# Patient Record
Sex: FEMALE | Race: WHITE | Employment: UNEMPLOYED | URBAN - METROPOLITAN AREA
[De-identification: names, ages, dates, MRNs, and addresses within clinical notes are randomized per-mention and may not be internally consistent; named-entity substitution may affect disease eponyms.]

---

## 2020-02-04 ENCOUNTER — TELEPHONE (OUTPATIENT)
Dept: ADMINISTRATIVE | Age: 35
End: 2020-02-04

## 2020-09-22 ENCOUNTER — HOSPITAL ENCOUNTER (OUTPATIENT)
Age: 35
Discharge: HOME | End: 2020-09-22
Payer: COMMERCIAL

## 2020-09-22 ENCOUNTER — HOSPITAL ENCOUNTER (OUTPATIENT)
Dept: ULTRASOUND IMAGING | Age: 35
Discharge: HOME | End: 2020-09-24
Payer: COMMERCIAL

## 2020-09-22 LAB
T4 TOTAL: 9.9 MCG/DL (ref 4.5–11.7)
TSH SERPL DL<=0.05 MIU/L-ACNC: 1.12 UIU/ML (ref 0.27–4.2)

## 2020-09-22 PROCEDURE — 84443 ASSAY THYROID STIM HORMONE: CPT

## 2020-09-22 PROCEDURE — 36415 COLL VENOUS BLD VENIPUNCTURE: CPT

## 2020-09-22 PROCEDURE — 76536 US EXAM OF HEAD AND NECK: CPT

## 2020-09-22 PROCEDURE — 84482 T3 REVERSE: CPT

## 2020-09-22 PROCEDURE — 84436 ASSAY OF TOTAL THYROXINE: CPT

## 2020-09-26 LAB — T3 REVERSE: 14.8 NG/DL (ref 9–27)

## 2020-10-16 ENCOUNTER — HOSPITAL ENCOUNTER (OUTPATIENT)
Dept: ULTRASOUND IMAGING | Age: 35
Discharge: HOME | End: 2020-10-18
Payer: COMMERCIAL

## 2020-10-16 PROCEDURE — 88305 TISSUE EXAM BY PATHOLOGIST: CPT

## 2020-10-16 PROCEDURE — 10005 FNA BX W/US GDN 1ST LES: CPT | Performed by: RADIOLOGY

## 2020-10-16 PROCEDURE — 88173 CYTOPATH EVAL FNA REPORT: CPT

## 2020-10-16 PROCEDURE — 10005 FNA BX W/US GDN 1ST LES: CPT

## 2020-10-16 NOTE — BRIEF OP NOTE
Brief Postoperative Note    Kyler Patterson  YOB: 1985  [id number]    Pre-operative Diagnosis: mass    Post-operative Diagnosis: Same    Procedure: biopsy    Estimated Blood Loss: < 10 cc    Surgeon: Nathanael IZAGUIRRE     Complications: none    Specimens obtained: Yes, biopsy of mass    Findings: biopsy of a mass      Nathanael Espinoza II   10/16/2020 11:47 AM

## 2020-10-16 NOTE — H&P
Interventional Radiology  Attending Pre-operative History and Physical    DIAGNOSIS:    Patient Active Problem List   Diagnosis    Internal derangement of left knee       CHIEF COMPLAINT: <principal problem not specified>        No current outpatient medications on file. No Known Allergies    No past medical history on file. History reviewed. No pertinent surgical history.     Family History   Problem Relation Age of Onset    Heart Disease Father        Social History     Socioeconomic History    Marital status:      Spouse name: Not on file    Number of children: Not on file    Years of education: Not on file    Highest education level: Not on file   Occupational History    Not on file   Social Needs    Financial resource strain: Not on file    Food insecurity     Worry: Not on file     Inability: Not on file    Transportation needs     Medical: Not on file     Non-medical: Not on file   Tobacco Use    Smoking status: Current Every Day Smoker     Packs/day: 0.50     Types: Cigarettes   Substance and Sexual Activity    Alcohol use: Yes     Comment: occ    Drug use: No    Sexual activity: Yes     Partners: Male   Lifestyle    Physical activity     Days per week: Not on file     Minutes per session: Not on file    Stress: Not on file   Relationships    Social connections     Talks on phone: Not on file     Gets together: Not on file     Attends [employer] service: Not on file     Active member of club or organization: Not on file     Attends meetings of clubs or organizations: Not on file     Relationship status: Not on file    Intimate partner violence     Fear of current or ex partner: Not on file     Emotionally abused: Not on file     Physically abused: Not on file     Forced sexual activity: Not on file   Other Topics Concern    Not on file   Social History Narrative    Not on file       ROS: Non-contributory other than as noted above    PHYSICAL EXAM:      Heent: Alert and orientated. Heart:  Rapid regular rhythm    Lungs:  demonstrate no contraindications to proceed      Abdomen:  normal      DATA:  CBC:   Lab Results   Component Value Date    WBC 13.0 10/09/2012    RBC 4.66 10/09/2012    HGB 14.4 10/09/2012    HCT 42.1 10/09/2012    MCV 90.4 10/09/2012    MCH 31.0 10/09/2012    MCHC 34.3 10/09/2012    RDW 12.8 10/09/2012     10/09/2012    MPV 8.3 10/09/2012     CBC with Differential:    Lab Results   Component Value Date    WBC 13.0 10/09/2012    RBC 4.66 10/09/2012    HGB 14.4 10/09/2012    HCT 42.1 10/09/2012     10/09/2012    MCV 90.4 10/09/2012    MCH 31.0 10/09/2012    MCHC 34.3 10/09/2012    RDW 12.8 10/09/2012    SEGSPCT 84 10/09/2012    LYMPHOPCT 12 10/09/2012    MONOPCT 4 10/09/2012    BASOPCT 0 10/09/2012    MONOSABS 0.49 10/09/2012    LYMPHSABS 1.53 10/09/2012    EOSABS 0.03 10/09/2012    BASOSABS 0.04 10/09/2012     Platelets:    Lab Results   Component Value Date     10/09/2012     BUN/Creatinine:    Lab Results   Component Value Date    BUN 9 10/09/2012    CREATININE 0.8 10/09/2012       ASSESSMENT AND PLAN:  1. Bilateral thyroid masses  2. Procedure options, risks and benefits reviewed with patient. Patient expresses understanding.     Electronically signed by Zoila Gonzalez II, MD on 10/16/2020 at 11:46 AM

## 2020-11-06 ENCOUNTER — HOSPITAL ENCOUNTER (OUTPATIENT)
Age: 35
Discharge: HOME | End: 2020-11-08
Payer: COMMERCIAL

## 2020-11-06 PROCEDURE — U0003 INFECTIOUS AGENT DETECTION BY NUCLEIC ACID (DNA OR RNA); SEVERE ACUTE RESPIRATORY SYNDROME CORONAVIRUS 2 (SARS-COV-2) (CORONAVIRUS DISEASE [COVID-19]), AMPLIFIED PROBE TECHNIQUE, MAKING USE OF HIGH THROUGHPUT TECHNOLOGIES AS DESCRIBED BY CMS-2020-01-R: HCPCS

## 2020-11-08 LAB
SARS-COV-2: NOT DETECTED
SOURCE: NORMAL

## 2020-11-10 NOTE — PROGRESS NOTES
[facility] PRE-ADMISSION TESTING INSTRUCTIONS    The Preadmission Testing patient is instructed accordingly using the following criteria (check applicable):    ARRIVAL INSTRUCTIONS:  [x] Parking the day of Surgery is located in the Main Entrance lot. Upon entering the door, make an immediate right to the surgery reception desk    [x] Bring photo ID and insurance card    [] Bring in a copy of Living will or Durable Power of  papers. [x] Please be sure to arrange transportation to and from the hospital    [x] Please arrange for someone to be with you the remainder of the day due to having anesthesia      GENERAL INSTRUCTIONS:    [x] Nothing by mouth after midnight, including gum, candy, mints or water    [x] You may brush your teeth, but do not swallow any water    [] Take medications as instructed with 1-2 oz of water    [] Stop herbal supplements and vitamins 5 days prior to procedure    [] Follow preop dosing of blood thinners per physician instructions    [] Do not take insulin or oral diabetic medications    [] If diabetic and have low blood sugar or feel symptomatic, take 1-2oz apple juice or glucose tablets    [] Bring inhalers day of surgery    [] Bring C-PAP/ Bi-Pap day of surgery    [] Bring urine specimen day of surgery    [x] Antibacterial Soap shower or bath AM of Surgery, no lotion, powders or creams to surgical site    [] Follow bowel prep as instructed per surgeon    [x] No tobacco products within 24 hours of surgery     [] No alcohol or illegal drug use within 24 hours of surgery.     [x] Jewelry, body piercing's, eyeglasses, contact lenses and dentures are not permitted into surgery (bring cases)      [x] Please do not wear any nail polish or make up on the day of surgery    [x] If not already done, you can expect a call from registration    [x] If surgeon requests a time change you will be notified the day prior to surgery    [x] If you receive a survey after

## 2020-11-11 ENCOUNTER — ANESTHESIA EVENT (OUTPATIENT)
Dept: OPERATING ROOM | Age: 35
End: 2020-11-11
Payer: COMMERCIAL

## 2020-11-12 ENCOUNTER — ANESTHESIA (OUTPATIENT)
Dept: OPERATING ROOM | Age: 35
End: 2020-11-12
Payer: COMMERCIAL

## 2020-11-12 ENCOUNTER — HOSPITAL ENCOUNTER (OUTPATIENT)
Age: 35
Setting detail: OBSERVATION
Discharge: HOME | End: 2020-11-13
Attending: OTOLARYNGOLOGY | Admitting: OTOLARYNGOLOGY
Payer: COMMERCIAL

## 2020-11-12 VITALS — SYSTOLIC BLOOD PRESSURE: 114 MMHG | OXYGEN SATURATION: 99 % | TEMPERATURE: 97.3 F | DIASTOLIC BLOOD PRESSURE: 78 MMHG

## 2020-11-12 PROBLEM — C73 PAPILLARY THYROID CARCINOMA (HCC): Status: ACTIVE | Noted: 2020-11-12

## 2020-11-12 LAB
ABO/RH: NORMAL
ALBUMIN SERPL-MCNC: 4.1 G/DL (ref 3.5–5.2)
ALP BLD-CCNC: 60 U/L (ref 35–104)
ALT SERPL-CCNC: 19 U/L (ref 0–32)
ANION GAP SERPL CALCULATED.3IONS-SCNC: 11 MMOL/L (ref 7–16)
ANTIBODY SCREEN: NORMAL
AST SERPL-CCNC: 28 U/L (ref 0–31)
BILIRUB SERPL-MCNC: 0.4 MG/DL (ref 0–1.2)
BUN BLDV-MCNC: 11 MG/DL (ref 6–20)
CALCIUM IONIZED: 1.25 MMOL/L (ref 1.15–1.33)
CALCIUM IONIZED: 1.26 MMOL/L (ref 1.15–1.33)
CALCIUM SERPL-MCNC: 8.6 MG/DL (ref 8.6–10.2)
CALCIUM SERPL-MCNC: 9 MG/DL (ref 8.6–10.2)
CALCIUM SERPL-MCNC: 9.2 MG/DL (ref 8.6–10.2)
CHLORIDE BLD-SCNC: 107 MMOL/L (ref 98–107)
CO2: 21 MMOL/L (ref 22–29)
CREAT SERPL-MCNC: 0.7 MG/DL (ref 0.5–1)
GFR AFRICAN AMERICAN: >60
GFR NON-AFRICAN AMERICAN: >60 ML/MIN/1.73
GLUCOSE BLD-MCNC: 102 MG/DL (ref 74–99)
HCG(URINE) PREGNANCY TEST: NEGATIVE
HCT VFR BLD CALC: 40.8 % (ref 34–48)
HEMOGLOBIN: 13.7 G/DL (ref 11.5–15.5)
MCH RBC QN AUTO: 29.9 PG (ref 26–35)
MCHC RBC AUTO-ENTMCNC: 33.6 % (ref 32–34.5)
MCV RBC AUTO: 89.1 FL (ref 80–99.9)
PARATHYROID HORMONE INTACT: 34 PG/ML (ref 15–65)
PDW BLD-RTO: 13.2 FL (ref 11.5–15)
PLATELET # BLD: 334 E9/L (ref 130–450)
PMV BLD AUTO: 9.7 FL (ref 7–12)
POTASSIUM REFLEX MAGNESIUM: 4.7 MMOL/L (ref 3.5–5)
RBC # BLD: 4.58 E12/L (ref 3.5–5.5)
SODIUM BLD-SCNC: 139 MMOL/L (ref 132–146)
TOTAL PROTEIN: 7.2 G/DL (ref 6.4–8.3)
WBC # BLD: 8.5 E9/L (ref 4.5–11.5)

## 2020-11-12 PROCEDURE — 86901 BLOOD TYPING SEROLOGIC RH(D): CPT

## 2020-11-12 PROCEDURE — 82330 ASSAY OF CALCIUM: CPT

## 2020-11-12 PROCEDURE — 36415 COLL VENOUS BLD VENIPUNCTURE: CPT

## 2020-11-12 PROCEDURE — 3700000000 HC ANESTHESIA ATTENDED CARE: Performed by: OTOLARYNGOLOGY

## 2020-11-12 PROCEDURE — 3600000013 HC SURGERY LEVEL 3 ADDTL 15MIN: Performed by: OTOLARYNGOLOGY

## 2020-11-12 PROCEDURE — 6360000002 HC RX W HCPCS: Performed by: OTOLARYNGOLOGY

## 2020-11-12 PROCEDURE — 88331 PATH CONSLTJ SURG 1 BLK 1SPC: CPT

## 2020-11-12 PROCEDURE — 2580000003 HC RX 258: Performed by: OTOLARYNGOLOGY

## 2020-11-12 PROCEDURE — 2500000003 HC RX 250 WO HCPCS

## 2020-11-12 PROCEDURE — 82310 ASSAY OF CALCIUM: CPT

## 2020-11-12 PROCEDURE — 3600000003 HC SURGERY LEVEL 3 BASE: Performed by: OTOLARYNGOLOGY

## 2020-11-12 PROCEDURE — 6360000002 HC RX W HCPCS

## 2020-11-12 PROCEDURE — 6360000002 HC RX W HCPCS: Performed by: ANESTHESIOLOGY

## 2020-11-12 PROCEDURE — 7100000001 HC PACU RECOVERY - ADDTL 15 MIN: Performed by: OTOLARYNGOLOGY

## 2020-11-12 PROCEDURE — 6370000000 HC RX 637 (ALT 250 FOR IP): Performed by: OTOLARYNGOLOGY

## 2020-11-12 PROCEDURE — 3700000001 HC ADD 15 MINUTES (ANESTHESIA): Performed by: OTOLARYNGOLOGY

## 2020-11-12 PROCEDURE — 85027 COMPLETE CBC AUTOMATED: CPT

## 2020-11-12 PROCEDURE — 86850 RBC ANTIBODY SCREEN: CPT

## 2020-11-12 PROCEDURE — 2709999900 HC NON-CHARGEABLE SUPPLY: Performed by: OTOLARYNGOLOGY

## 2020-11-12 PROCEDURE — 2720000010 HC SURG SUPPLY STERILE: Performed by: OTOLARYNGOLOGY

## 2020-11-12 PROCEDURE — 2500000003 HC RX 250 WO HCPCS: Performed by: OTOLARYNGOLOGY

## 2020-11-12 PROCEDURE — G0378 HOSPITAL OBSERVATION PER HR: HCPCS

## 2020-11-12 PROCEDURE — 83970 ASSAY OF PARATHORMONE: CPT

## 2020-11-12 PROCEDURE — 86900 BLOOD TYPING SEROLOGIC ABO: CPT

## 2020-11-12 PROCEDURE — 7100000000 HC PACU RECOVERY - FIRST 15 MIN: Performed by: OTOLARYNGOLOGY

## 2020-11-12 PROCEDURE — 88307 TISSUE EXAM BY PATHOLOGIST: CPT

## 2020-11-12 PROCEDURE — 80053 COMPREHEN METABOLIC PANEL: CPT

## 2020-11-12 PROCEDURE — 81025 URINE PREGNANCY TEST: CPT

## 2020-11-12 RX ORDER — OXYCODONE HYDROCHLORIDE 5 MG/1
10 TABLET ORAL EVERY 4 HOURS PRN
Status: CANCELLED | OUTPATIENT
Start: 2020-11-12

## 2020-11-12 RX ORDER — ONDANSETRON 2 MG/ML
4 INJECTION INTRAMUSCULAR; INTRAVENOUS EVERY 6 HOURS PRN
Status: CANCELLED | OUTPATIENT
Start: 2020-11-12

## 2020-11-12 RX ORDER — SENNA AND DOCUSATE SODIUM 50; 8.6 MG/1; MG/1
1 TABLET, FILM COATED ORAL 2 TIMES DAILY
Status: DISCONTINUED | OUTPATIENT
Start: 2020-11-12 | End: 2020-11-13 | Stop reason: HOSPADM

## 2020-11-12 RX ORDER — OYSTER SHELL CALCIUM WITH VITAMIN D 500; 200 MG/1; [IU]/1
1 TABLET, FILM COATED ORAL DAILY
Status: DISCONTINUED | OUTPATIENT
Start: 2020-11-12 | End: 2020-11-13 | Stop reason: HOSPADM

## 2020-11-12 RX ORDER — MIDAZOLAM HYDROCHLORIDE 1 MG/ML
INJECTION INTRAMUSCULAR; INTRAVENOUS PRN
Status: DISCONTINUED | OUTPATIENT
Start: 2020-11-12 | End: 2020-11-12 | Stop reason: SDUPTHER

## 2020-11-12 RX ORDER — CEPHALEXIN 500 MG/1
500 CAPSULE ORAL 4 TIMES DAILY
Qty: 28 CAPSULE | Refills: 0 | Status: SHIPPED | OUTPATIENT
Start: 2020-11-12 | End: 2020-11-19

## 2020-11-12 RX ORDER — MEPERIDINE HYDROCHLORIDE 25 MG/ML
12.5 INJECTION INTRAMUSCULAR; INTRAVENOUS; SUBCUTANEOUS EVERY 5 MIN PRN
Status: DISCONTINUED | OUTPATIENT
Start: 2020-11-12 | End: 2020-11-12

## 2020-11-12 RX ORDER — PROMETHAZINE HYDROCHLORIDE 25 MG/ML
6.25 INJECTION, SOLUTION INTRAMUSCULAR; INTRAVENOUS
Status: DISCONTINUED | OUTPATIENT
Start: 2020-11-12 | End: 2020-11-12

## 2020-11-12 RX ORDER — SODIUM CHLORIDE, SODIUM LACTATE, POTASSIUM CHLORIDE, CALCIUM CHLORIDE 600; 310; 30; 20 MG/100ML; MG/100ML; MG/100ML; MG/100ML
INJECTION, SOLUTION INTRAVENOUS CONTINUOUS
Status: DISCONTINUED | OUTPATIENT
Start: 2020-11-12 | End: 2020-11-13 | Stop reason: HOSPADM

## 2020-11-12 RX ORDER — OYSTER SHELL CALCIUM WITH VITAMIN D 500; 200 MG/1; [IU]/1
1 TABLET, FILM COATED ORAL DAILY
Status: DISCONTINUED | OUTPATIENT
Start: 2020-11-12 | End: 2020-11-12

## 2020-11-12 RX ORDER — SODIUM CHLORIDE 0.9 % (FLUSH) 0.9 %
10 SYRINGE (ML) INJECTION PRN
Status: DISCONTINUED | OUTPATIENT
Start: 2020-11-12 | End: 2020-11-13 | Stop reason: HOSPADM

## 2020-11-12 RX ORDER — FENTANYL CITRATE 50 UG/ML
INJECTION, SOLUTION INTRAMUSCULAR; INTRAVENOUS PRN
Status: DISCONTINUED | OUTPATIENT
Start: 2020-11-12 | End: 2020-11-12 | Stop reason: SDUPTHER

## 2020-11-12 RX ORDER — PROPOFOL 10 MG/ML
INJECTION, EMULSION INTRAVENOUS PRN
Status: DISCONTINUED | OUTPATIENT
Start: 2020-11-12 | End: 2020-11-12 | Stop reason: SDUPTHER

## 2020-11-12 RX ORDER — LIDOCAINE HYDROCHLORIDE 20 MG/ML
INJECTION, SOLUTION EPIDURAL; INFILTRATION; INTRACAUDAL; PERINEURAL PRN
Status: DISCONTINUED | OUTPATIENT
Start: 2020-11-12 | End: 2020-11-12 | Stop reason: SDUPTHER

## 2020-11-12 RX ORDER — DIPHENHYDRAMINE HYDROCHLORIDE 50 MG/ML
12.5 INJECTION INTRAMUSCULAR; INTRAVENOUS
Status: DISCONTINUED | OUTPATIENT
Start: 2020-11-12 | End: 2020-11-12

## 2020-11-12 RX ORDER — PHENYLEPHRINE HYDROCHLORIDE 10 MG/ML
INJECTION INTRAVENOUS PRN
Status: DISCONTINUED | OUTPATIENT
Start: 2020-11-12 | End: 2020-11-12 | Stop reason: SDUPTHER

## 2020-11-12 RX ORDER — SODIUM CHLORIDE 0.9 % (FLUSH) 0.9 %
10 SYRINGE (ML) INJECTION PRN
Status: CANCELLED | OUTPATIENT
Start: 2020-11-12

## 2020-11-12 RX ORDER — LEVOTHYROXINE SODIUM 0.1 MG/1
100 TABLET ORAL DAILY
Status: DISCONTINUED | OUTPATIENT
Start: 2020-11-12 | End: 2020-11-12

## 2020-11-12 RX ORDER — PROMETHAZINE HYDROCHLORIDE 25 MG/1
12.5 TABLET ORAL EVERY 6 HOURS PRN
Status: DISCONTINUED | OUTPATIENT
Start: 2020-11-12 | End: 2020-11-13 | Stop reason: HOSPADM

## 2020-11-12 RX ORDER — OXYCODONE HYDROCHLORIDE 5 MG/1
10 TABLET ORAL EVERY 4 HOURS PRN
Status: DISCONTINUED | OUTPATIENT
Start: 2020-11-12 | End: 2020-11-13 | Stop reason: HOSPADM

## 2020-11-12 RX ORDER — SEVOFLURANE 250 ML/250ML
LIQUID RESPIRATORY (INHALATION) CONTINUOUS PRN
Status: DISCONTINUED | OUTPATIENT
Start: 2020-11-12 | End: 2020-11-12 | Stop reason: SDUPTHER

## 2020-11-12 RX ORDER — PROMETHAZINE HYDROCHLORIDE 25 MG/1
12.5 TABLET ORAL EVERY 6 HOURS PRN
Status: CANCELLED | OUTPATIENT
Start: 2020-11-12

## 2020-11-12 RX ORDER — MORPHINE SULFATE 2 MG/ML
2 INJECTION, SOLUTION INTRAMUSCULAR; INTRAVENOUS
Status: CANCELLED | OUTPATIENT
Start: 2020-11-12

## 2020-11-12 RX ORDER — MORPHINE SULFATE 2 MG/ML
2 INJECTION, SOLUTION INTRAMUSCULAR; INTRAVENOUS
Status: DISCONTINUED | OUTPATIENT
Start: 2020-11-12 | End: 2020-11-13 | Stop reason: HOSPADM

## 2020-11-12 RX ORDER — SENNA AND DOCUSATE SODIUM 50; 8.6 MG/1; MG/1
1 TABLET, FILM COATED ORAL 2 TIMES DAILY
Status: CANCELLED | OUTPATIENT
Start: 2020-11-12

## 2020-11-12 RX ORDER — OYSTER SHELL CALCIUM WITH VITAMIN D 500; 200 MG/1; [IU]/1
1 TABLET, FILM COATED ORAL DAILY
Qty: 30 TABLET | Refills: 3 | Status: ON HOLD | OUTPATIENT
Start: 2020-11-12 | End: 2022-07-24 | Stop reason: HOSPADM

## 2020-11-12 RX ORDER — OXYCODONE HYDROCHLORIDE AND ACETAMINOPHEN 5; 325 MG/1; MG/1
1 TABLET ORAL
Status: DISCONTINUED | OUTPATIENT
Start: 2020-11-12 | End: 2020-11-12

## 2020-11-12 RX ORDER — SODIUM CHLORIDE 0.9 % (FLUSH) 0.9 %
10 SYRINGE (ML) INJECTION PRN
Status: DISCONTINUED | OUTPATIENT
Start: 2020-11-12 | End: 2020-11-12

## 2020-11-12 RX ORDER — OXYCODONE HYDROCHLORIDE 5 MG/1
5 TABLET ORAL EVERY 4 HOURS PRN
Status: DISCONTINUED | OUTPATIENT
Start: 2020-11-12 | End: 2020-11-13 | Stop reason: HOSPADM

## 2020-11-12 RX ORDER — ACETAMINOPHEN 325 MG/1
650 TABLET ORAL EVERY 6 HOURS
Status: CANCELLED | OUTPATIENT
Start: 2020-11-12

## 2020-11-12 RX ORDER — SODIUM CHLORIDE 0.9 % (FLUSH) 0.9 %
10 SYRINGE (ML) INJECTION EVERY 12 HOURS SCHEDULED
Status: CANCELLED | OUTPATIENT
Start: 2020-11-12

## 2020-11-12 RX ORDER — ONDANSETRON 2 MG/ML
INJECTION INTRAMUSCULAR; INTRAVENOUS PRN
Status: DISCONTINUED | OUTPATIENT
Start: 2020-11-12 | End: 2020-11-12 | Stop reason: SDUPTHER

## 2020-11-12 RX ORDER — ONDANSETRON 4 MG/1
4 TABLET, ORALLY DISINTEGRATING ORAL EVERY 8 HOURS PRN
Qty: 21 TABLET | Refills: 0 | Status: SHIPPED | OUTPATIENT
Start: 2020-11-12 | End: 2020-11-19

## 2020-11-12 RX ORDER — ACETAMINOPHEN 325 MG/1
650 TABLET ORAL EVERY 6 HOURS
Status: DISCONTINUED | OUTPATIENT
Start: 2020-11-12 | End: 2020-11-13 | Stop reason: HOSPADM

## 2020-11-12 RX ORDER — LEVOTHYROXINE SODIUM 0.1 MG/1
100 TABLET ORAL DAILY
Qty: 30 TABLET | Refills: 3 | Status: ON HOLD | OUTPATIENT
Start: 2020-11-12 | End: 2022-07-24 | Stop reason: HOSPADM

## 2020-11-12 RX ORDER — FENTANYL CITRATE 50 UG/ML
50 INJECTION, SOLUTION INTRAMUSCULAR; INTRAVENOUS EVERY 5 MIN PRN
Status: DISCONTINUED | OUTPATIENT
Start: 2020-11-12 | End: 2020-11-12

## 2020-11-12 RX ORDER — SODIUM CHLORIDE, SODIUM LACTATE, POTASSIUM CHLORIDE, CALCIUM CHLORIDE 600; 310; 30; 20 MG/100ML; MG/100ML; MG/100ML; MG/100ML
INJECTION, SOLUTION INTRAVENOUS CONTINUOUS
Status: CANCELLED | OUTPATIENT
Start: 2020-11-12

## 2020-11-12 RX ORDER — LEVOTHYROXINE SODIUM 0.1 MG/1
100 TABLET ORAL DAILY
Status: DISCONTINUED | OUTPATIENT
Start: 2020-11-12 | End: 2020-11-13 | Stop reason: HOSPADM

## 2020-11-12 RX ORDER — SUCCINYLCHOLINE/SOD CL,ISO/PF 200MG/10ML
SYRINGE (ML) INTRAVENOUS PRN
Status: DISCONTINUED | OUTPATIENT
Start: 2020-11-12 | End: 2020-11-12 | Stop reason: SDUPTHER

## 2020-11-12 RX ORDER — SODIUM CHLORIDE 0.9 % (FLUSH) 0.9 %
10 SYRINGE (ML) INJECTION EVERY 12 HOURS SCHEDULED
Status: DISCONTINUED | OUTPATIENT
Start: 2020-11-12 | End: 2020-11-12

## 2020-11-12 RX ORDER — SODIUM CHLORIDE, SODIUM LACTATE, POTASSIUM CHLORIDE, CALCIUM CHLORIDE 600; 310; 30; 20 MG/100ML; MG/100ML; MG/100ML; MG/100ML
INJECTION, SOLUTION INTRAVENOUS CONTINUOUS
Status: DISCONTINUED | OUTPATIENT
Start: 2020-11-12 | End: 2020-11-12

## 2020-11-12 RX ORDER — SODIUM CHLORIDE 0.9 % (FLUSH) 0.9 %
10 SYRINGE (ML) INJECTION EVERY 12 HOURS SCHEDULED
Status: DISCONTINUED | OUTPATIENT
Start: 2020-11-12 | End: 2020-11-13 | Stop reason: HOSPADM

## 2020-11-12 RX ORDER — LIDOCAINE HYDROCHLORIDE AND EPINEPHRINE 10; 10 MG/ML; UG/ML
INJECTION, SOLUTION INFILTRATION; PERINEURAL PRN
Status: DISCONTINUED | OUTPATIENT
Start: 2020-11-12 | End: 2020-11-12 | Stop reason: ALTCHOICE

## 2020-11-12 RX ORDER — OXYCODONE HYDROCHLORIDE 5 MG/1
5 TABLET ORAL EVERY 4 HOURS PRN
Status: CANCELLED | OUTPATIENT
Start: 2020-11-12

## 2020-11-12 RX ORDER — HYDROCODONE BITARTRATE AND ACETAMINOPHEN 5; 325 MG/1; MG/1
1 TABLET ORAL EVERY 4 HOURS PRN
Qty: 18 TABLET | Refills: 0 | Status: SHIPPED | OUTPATIENT
Start: 2020-11-12 | End: 2020-11-15

## 2020-11-12 RX ORDER — ONDANSETRON 2 MG/ML
4 INJECTION INTRAMUSCULAR; INTRAVENOUS EVERY 6 HOURS PRN
Status: DISCONTINUED | OUTPATIENT
Start: 2020-11-12 | End: 2020-11-13 | Stop reason: HOSPADM

## 2020-11-12 RX ORDER — FENTANYL CITRATE 50 UG/ML
25 INJECTION, SOLUTION INTRAMUSCULAR; INTRAVENOUS EVERY 5 MIN PRN
Status: DISCONTINUED | OUTPATIENT
Start: 2020-11-12 | End: 2020-11-12

## 2020-11-12 RX ADMIN — Medication 1 TABLET: at 13:25

## 2020-11-12 RX ADMIN — SODIUM CHLORIDE, POTASSIUM CHLORIDE, SODIUM LACTATE AND CALCIUM CHLORIDE: 600; 310; 30; 20 INJECTION, SOLUTION INTRAVENOUS at 14:17

## 2020-11-12 RX ADMIN — FENTANYL CITRATE 25 MCG: 50 INJECTION, SOLUTION INTRAMUSCULAR; INTRAVENOUS at 11:55

## 2020-11-12 RX ADMIN — HYDROMORPHONE HYDROCHLORIDE 0.5 MG: 1 INJECTION, SOLUTION INTRAMUSCULAR; INTRAVENOUS; SUBCUTANEOUS at 12:32

## 2020-11-12 RX ADMIN — OXYCODONE HYDROCHLORIDE 5 MG: 5 TABLET ORAL at 18:27

## 2020-11-12 RX ADMIN — SEVOFLURANE 2 %: 1 LIQUID RESPIRATORY (INHALATION) at 09:18

## 2020-11-12 RX ADMIN — FENTANYL CITRATE 50 MCG: 50 INJECTION, SOLUTION INTRAMUSCULAR; INTRAVENOUS at 10:41

## 2020-11-12 RX ADMIN — FENTANYL CITRATE 50 MCG: 50 INJECTION, SOLUTION INTRAMUSCULAR; INTRAVENOUS at 09:16

## 2020-11-12 RX ADMIN — PROPOFOL 200 MG: 10 INJECTION, EMULSION INTRAVENOUS at 09:16

## 2020-11-12 RX ADMIN — FENTANYL CITRATE 25 MCG: 50 INJECTION, SOLUTION INTRAMUSCULAR; INTRAVENOUS at 12:00

## 2020-11-12 RX ADMIN — PHENYLEPHRINE HYDROCHLORIDE 200 MCG: 10 INJECTION INTRAVENOUS at 09:33

## 2020-11-12 RX ADMIN — SODIUM CHLORIDE, POTASSIUM CHLORIDE, SODIUM LACTATE AND CALCIUM CHLORIDE: 600; 310; 30; 20 INJECTION, SOLUTION INTRAVENOUS at 10:05

## 2020-11-12 RX ADMIN — FENTANYL CITRATE 50 MCG: 50 INJECTION, SOLUTION INTRAMUSCULAR; INTRAVENOUS at 10:00

## 2020-11-12 RX ADMIN — HYDROMORPHONE HYDROCHLORIDE 0.5 MG: 1 INJECTION, SOLUTION INTRAMUSCULAR; INTRAVENOUS; SUBCUTANEOUS at 12:13

## 2020-11-12 RX ADMIN — PROMETHAZINE HYDROCHLORIDE 6.25 MG: 25 INJECTION INTRAMUSCULAR; INTRAVENOUS at 12:17

## 2020-11-12 RX ADMIN — ONDANSETRON 4 MG: 2 INJECTION INTRAMUSCULAR; INTRAVENOUS at 10:19

## 2020-11-12 RX ADMIN — DOCUSATE SODIUM 50 MG AND SENNOSIDES 8.6 MG 1 TABLET: 8.6; 5 TABLET, FILM COATED ORAL at 20:21

## 2020-11-12 RX ADMIN — FENTANYL CITRATE 50 MCG: 50 INJECTION, SOLUTION INTRAMUSCULAR; INTRAVENOUS at 09:33

## 2020-11-12 RX ADMIN — MIDAZOLAM 2 MG: 1 INJECTION INTRAMUSCULAR; INTRAVENOUS at 09:10

## 2020-11-12 RX ADMIN — Medication 2 G: at 09:10

## 2020-11-12 RX ADMIN — FENTANYL CITRATE 25 MCG: 50 INJECTION, SOLUTION INTRAMUSCULAR; INTRAVENOUS at 10:59

## 2020-11-12 RX ADMIN — LEVOTHYROXINE SODIUM 100 MCG: 100 TABLET ORAL at 13:31

## 2020-11-12 RX ADMIN — Medication 2 G: at 20:21

## 2020-11-12 RX ADMIN — PHENYLEPHRINE HYDROCHLORIDE 100 MCG: 10 INJECTION INTRAVENOUS at 09:31

## 2020-11-12 RX ADMIN — DOCUSATE SODIUM 50 MG AND SENNOSIDES 8.6 MG 1 TABLET: 8.6; 5 TABLET, FILM COATED ORAL at 13:25

## 2020-11-12 RX ADMIN — FENTANYL CITRATE 25 MCG: 50 INJECTION, SOLUTION INTRAMUSCULAR; INTRAVENOUS at 09:46

## 2020-11-12 RX ADMIN — LIDOCAINE HYDROCHLORIDE 100 MG: 20 INJECTION, SOLUTION EPIDURAL; INFILTRATION; INTRACAUDAL; PERINEURAL at 09:16

## 2020-11-12 RX ADMIN — ACETAMINOPHEN 650 MG: 325 TABLET ORAL at 18:31

## 2020-11-12 RX ADMIN — Medication 2 G: at 13:24

## 2020-11-12 RX ADMIN — Medication 140 MG: at 09:16

## 2020-11-12 RX ADMIN — FENTANYL CITRATE 50 MCG: 50 INJECTION, SOLUTION INTRAMUSCULAR; INTRAVENOUS at 12:12

## 2020-11-12 RX ADMIN — ACETAMINOPHEN 650 MG: 325 TABLET ORAL at 13:25

## 2020-11-12 RX ADMIN — SODIUM CHLORIDE, POTASSIUM CHLORIDE, SODIUM LACTATE AND CALCIUM CHLORIDE: 600; 310; 30; 20 INJECTION, SOLUTION INTRAVENOUS at 09:10

## 2020-11-12 ASSESSMENT — PULMONARY FUNCTION TESTS
PIF_VALUE: 31
PIF_VALUE: 23
PIF_VALUE: 20
PIF_VALUE: 0
PIF_VALUE: 18
PIF_VALUE: 21
PIF_VALUE: 24
PIF_VALUE: 18
PIF_VALUE: 29
PIF_VALUE: 27
PIF_VALUE: 19
PIF_VALUE: 18
PIF_VALUE: 3
PIF_VALUE: 28
PIF_VALUE: 26
PIF_VALUE: 27
PIF_VALUE: 20
PIF_VALUE: 2
PIF_VALUE: 3
PIF_VALUE: 28
PIF_VALUE: 17
PIF_VALUE: 28
PIF_VALUE: 33
PIF_VALUE: 20
PIF_VALUE: 18
PIF_VALUE: 28
PIF_VALUE: 20
PIF_VALUE: 2
PIF_VALUE: 2
PIF_VALUE: 31
PIF_VALUE: 26
PIF_VALUE: 31
PIF_VALUE: 25
PIF_VALUE: 25
PIF_VALUE: 20
PIF_VALUE: 15
PIF_VALUE: 1
PIF_VALUE: 2
PIF_VALUE: 18
PIF_VALUE: 15
PIF_VALUE: 21
PIF_VALUE: 28
PIF_VALUE: 2
PIF_VALUE: 27
PIF_VALUE: 5
PIF_VALUE: 26
PIF_VALUE: 26
PIF_VALUE: 28
PIF_VALUE: 20
PIF_VALUE: 29
PIF_VALUE: 0
PIF_VALUE: 20
PIF_VALUE: 28
PIF_VALUE: 21
PIF_VALUE: 28
PIF_VALUE: 14
PIF_VALUE: 15
PIF_VALUE: 20
PIF_VALUE: 32
PIF_VALUE: 2
PIF_VALUE: 33
PIF_VALUE: 26
PIF_VALUE: 14
PIF_VALUE: 1
PIF_VALUE: 26
PIF_VALUE: 19
PIF_VALUE: 25
PIF_VALUE: 0
PIF_VALUE: 17
PIF_VALUE: 2
PIF_VALUE: 25
PIF_VALUE: 31
PIF_VALUE: 33
PIF_VALUE: 24
PIF_VALUE: 26
PIF_VALUE: 27
PIF_VALUE: 26
PIF_VALUE: 26
PIF_VALUE: 15
PIF_VALUE: 18
PIF_VALUE: 26
PIF_VALUE: 18
PIF_VALUE: 17
PIF_VALUE: 0
PIF_VALUE: 26
PIF_VALUE: 17
PIF_VALUE: 21
PIF_VALUE: 26
PIF_VALUE: 2
PIF_VALUE: 25
PIF_VALUE: 22
PIF_VALUE: 26
PIF_VALUE: 20
PIF_VALUE: 26
PIF_VALUE: 17
PIF_VALUE: 24
PIF_VALUE: 2
PIF_VALUE: 27
PIF_VALUE: 2
PIF_VALUE: 17
PIF_VALUE: 21
PIF_VALUE: 27
PIF_VALUE: 18
PIF_VALUE: 0
PIF_VALUE: 31
PIF_VALUE: 15
PIF_VALUE: 29
PIF_VALUE: 2
PIF_VALUE: 3
PIF_VALUE: 27
PIF_VALUE: 25
PIF_VALUE: 15
PIF_VALUE: 20
PIF_VALUE: 27
PIF_VALUE: 15
PIF_VALUE: 28
PIF_VALUE: 26
PIF_VALUE: 25
PIF_VALUE: 26
PIF_VALUE: 33
PIF_VALUE: 29
PIF_VALUE: 29
PIF_VALUE: 30
PIF_VALUE: 20
PIF_VALUE: 17
PIF_VALUE: 2
PIF_VALUE: 27
PIF_VALUE: 15
PIF_VALUE: 28
PIF_VALUE: 27
PIF_VALUE: 21
PIF_VALUE: 17
PIF_VALUE: 29
PIF_VALUE: 0
PIF_VALUE: 20
PIF_VALUE: 15
PIF_VALUE: 21
PIF_VALUE: 17
PIF_VALUE: 29
PIF_VALUE: 27
PIF_VALUE: 26
PIF_VALUE: 25
PIF_VALUE: 2
PIF_VALUE: 28
PIF_VALUE: 23
PIF_VALUE: 22
PIF_VALUE: 25
PIF_VALUE: 26
PIF_VALUE: 25
PIF_VALUE: 28
PIF_VALUE: 2
PIF_VALUE: 15
PIF_VALUE: 35
PIF_VALUE: 34
PIF_VALUE: 2
PIF_VALUE: 30
PIF_VALUE: 5
PIF_VALUE: 26
PIF_VALUE: 25
PIF_VALUE: 24
PIF_VALUE: 27
PIF_VALUE: 20
PIF_VALUE: 26
PIF_VALUE: 18
PIF_VALUE: 26
PIF_VALUE: 2
PIF_VALUE: 25
PIF_VALUE: 26
PIF_VALUE: 15
PIF_VALUE: 19
PIF_VALUE: 32
PIF_VALUE: 25
PIF_VALUE: 28

## 2020-11-12 ASSESSMENT — PAIN DESCRIPTION - PAIN TYPE
TYPE: SURGICAL PAIN

## 2020-11-12 ASSESSMENT — PAIN SCALES - GENERAL
PAINLEVEL_OUTOF10: 8
PAINLEVEL_OUTOF10: 8
PAINLEVEL_OUTOF10: 9
PAINLEVEL_OUTOF10: 6
PAINLEVEL_OUTOF10: 9
PAINLEVEL_OUTOF10: 5
PAINLEVEL_OUTOF10: 5
PAINLEVEL_OUTOF10: 8
PAINLEVEL_OUTOF10: 8

## 2020-11-12 ASSESSMENT — PAIN DESCRIPTION - LOCATION: LOCATION: THROAT

## 2020-11-12 ASSESSMENT — LIFESTYLE VARIABLES: SMOKING_STATUS: 1

## 2020-11-12 NOTE — H&P
H&P reviewed, no changes. No issues. Questions and concerns were answered to the patient's satisfaction.  Will proceed with procedure    Will discuss with attending     Electronically signed by Yadi Sheridan DO on 11/12/20 at 8:51 AM EST

## 2020-11-12 NOTE — PROGRESS NOTES
INTRAOPERATIVE CONSULTATION (with FROZEN SECTION)    A.  Left thyroid, lobectomy: Papillary carcinoma

## 2020-11-12 NOTE — BRIEF OP NOTE
Brief Postoperative Note      Patient: Cheli Emery  YOB: 1985  MRN: [id number]    Date of Procedure: 11/12/2020    Pre-Op Diagnosis: LEFT THYROID GOITER    Post-Op Diagnosis: papillary thyroid carcinoma       Procedure(s):  TOTAL THYROIDECTOMY, CENTRAL NECK DISSECTION WITH NERVE INTEGRITY MONITOR    Surgeon(s):  Mark Ruff MD    Assistant:  Surgical Assistant: Laura Wagoner  Resident: Dominic Loja DO    Anesthesia: General    Estimated Blood Loss (mL): less than 50     Complications: None    Specimens:   ID Type Source Tests Collected by Time Destination   A : LEFT THYROID (FROZEN)  Tissue Tissue SURGICAL PATHOLOGY Mark Ruff MD 11/12/2020 1016    B : RIGHT THYROID LOBE AND ISTHMUS Tissue Tissue SURGICAL PATHOLOGY Mark Ruff MD 11/12/2020 1135        Implants:  * No implants in log *      Drains:   Closed/Suction Drain Left; Anterior Neck Bulb [address] (Active)   Dressing Status Clean;Dry; Intact 11/12/20 1203   Drainage Appearance Serosanguinous 11/12/20 1203   Status Compressed 11/12/20 1203   Output (ml) 2 ml 11/12/20 1203       Findings: ~2.5 cm left thyroid nodule.  No nodules on right thyroid gland or isthmus noted    Electronically signed by Dominic Loja DO on 11/12/2020 at 12:31 PM

## 2020-11-12 NOTE — PROGRESS NOTES
PHONE [address]- DR. GANDHI SPOKE WITH  SARAH AND INFORMED HIM THAT WE NEEDED TO REMOVE THE TOTAL THYROID GLAND DUE TO FROZEN SPECIMEN PATHOLOGY REPORT.

## 2020-11-12 NOTE — ANESTHESIA PRE PROCEDURE
Department of Anesthesiology  Preprocedure Note       Name:  Daron Lopez   Age:  30 y.o.  :  1985                                          MRN:  [id number]         Date:  2020      Surgeon: Sweta Green):  Jennifer Brown MD    Procedure: Procedure(s):  LEFT SUBTOTAL THYROIDECTOMY POSS TOTAL  THYROIDECTOMY WITH NERVE INTEGRITY MONITOR    Medications prior to admission:   Prior to Admission medications    Not on File       Current medications:    No current facility-administered medications for this encounter. Allergies:  No Known Allergies    Problem List:    Patient Active Problem List   Diagnosis Code    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       Past Medical History:        Diagnosis Date    Thyroid disease     nodule- [id number]        Past Surgical History:        Procedure Laterality Date    FOOT SURGERY Right 2019       Social History:    Social History     Tobacco Use    Smoking status: Current Every Day Smoker     Packs/day: 0.50     Types: Cigarettes    Smokeless tobacco: Never Used   Substance Use Topics    Alcohol use: Yes     Comment: occ                                Ready to quit: Yes  Counseling given: Not Answered      Vital Signs (Current):   Vitals:    11/10/20 1625   Weight: 150 lb (68 kg)   Height: 5' 6\" (1.676 m)                                              BP Readings from Last 3 Encounters:   16 119/85   16 (!) 133/93       NPO Status:> 8 hrs                                                                                 BMI:   Wt Readings from Last 3 Encounters:   11/10/20 150 lb (68 kg)   10/19/16 135 lb (61.2 kg)   16 135 lb (61.2 kg)     Body mass index is 24.21 kg/m².     CBC:   Lab Results   Component Value Date    WBC 13.0 10/09/2012    RBC 4.66 10/09/2012    HGB 14.4 10/09/2012    HCT 42.1 10/09/2012    MCV 90.4 10/09/2012    RDW 12.8 10/09/2012     10/09/2012       CMP:   Lab Results   Component Value Date     10/09/2012    K 3.5 10/09/2012     10/09/2012    CO2 26 10/09/2012    BUN 9 10/09/2012    CREATININE 0.8 10/09/2012    LABGLOM >60 10/09/2012    GLUCOSE 99 10/09/2012    CALCIUM 9.4 10/09/2012       POC Tests: No results for input(s): POCGLU, POCNA, POCK, POCCL, POCBUN, POCHEMO, POCHCT in the last 72 hours. Coags: No results found for: PROTIME, INR, APTT    HCG (If Applicable):   Lab Results   Component Value Date    PREGTESTUR NEGATIVE 10/09/2012        ABGs: No results found for: PHART, PO2ART, KHX9PWS, WJP2ZLF, BEART, R2JROZWL     Type & Screen (If Applicable):  No results found for: LABABO, LABRH    Drug/Infectious Status (If Applicable):  No results found for: HIV, HEPCAB    COVID-19 Screening (If Applicable):   Lab Results   Component Value Date    COVID19 Not Detected 11/06/2020         Anesthesia Evaluation  Patient summary reviewed and Nursing notes reviewed no history of anesthetic complications:   Airway: Mallampati: II  TM distance: >3 FB   Neck ROM: full  Mouth opening: > = 3 FB Dental:      Comment: Pt denies any loose chipped or missing teeth    Pulmonary: breath sounds clear to auscultation  (+) current smoker (1/2 ppd)          Patient did not smoke on day of surgery. Cardiovascular:Negative CV ROS            Rhythm: regular  Rate: normal                    Neuro/Psych:   Negative Neuro/Psych ROS              GI/Hepatic/Renal: Neg GI/Hepatic/Renal ROS            Endo/Other:                      ROS comment: Left Thyroid mass - Pt states \"it feels like I have a rock in my throat\", but no c/o difficulty breathing or swallowing Abdominal:       Abdomen: soft. Vascular: negative vascular ROS. Anesthesia Plan      general     ASA 2       Induction: intravenous. BIS  MIPS: Postoperative opioids intended and Prophylactic antiemetics administered. Anesthetic plan and risks discussed with patient.     Use of blood products discussed with patient whom consented to blood products. Plan discussed with CRNA and attending. Selvin Pizano RN   11/12/2020    Agree with above assessment. Physical exam unchanged. Spoke to patient about anesthetic plan. Patient understands and wishes to proceed.

## 2020-11-13 VITALS
BODY MASS INDEX: 24.14 KG/M2 | WEIGHT: 150.2 LBS | SYSTOLIC BLOOD PRESSURE: 137 MMHG | HEART RATE: 65 BPM | RESPIRATION RATE: 16 BRPM | TEMPERATURE: 98 F | OXYGEN SATURATION: 96 % | HEIGHT: 66 IN | DIASTOLIC BLOOD PRESSURE: 77 MMHG

## 2020-11-13 LAB
CALCIUM IONIZED: 1.21 MMOL/L (ref 1.15–1.33)
CALCIUM IONIZED: 1.26 MMOL/L (ref 1.15–1.33)
CALCIUM SERPL-MCNC: 8.7 MG/DL (ref 8.6–10.2)
CALCIUM SERPL-MCNC: 8.8 MG/DL (ref 8.6–10.2)

## 2020-11-13 PROCEDURE — 36415 COLL VENOUS BLD VENIPUNCTURE: CPT

## 2020-11-13 PROCEDURE — 82330 ASSAY OF CALCIUM: CPT

## 2020-11-13 PROCEDURE — 82310 ASSAY OF CALCIUM: CPT

## 2020-11-13 PROCEDURE — G0378 HOSPITAL OBSERVATION PER HR: HCPCS

## 2020-11-13 PROCEDURE — 6370000000 HC RX 637 (ALT 250 FOR IP): Performed by: OTOLARYNGOLOGY

## 2020-11-13 RX ADMIN — LEVOTHYROXINE SODIUM 100 MCG: 100 TABLET ORAL at 06:23

## 2020-11-13 RX ADMIN — DOCUSATE SODIUM 50 MG AND SENNOSIDES 8.6 MG 1 TABLET: 8.6; 5 TABLET, FILM COATED ORAL at 07:28

## 2020-11-13 RX ADMIN — OXYCODONE HYDROCHLORIDE 10 MG: 5 TABLET ORAL at 07:36

## 2020-11-13 RX ADMIN — Medication 1 TABLET: at 07:28

## 2020-11-13 RX ADMIN — ACETAMINOPHEN 650 MG: 325 TABLET ORAL at 00:52

## 2020-11-13 RX ADMIN — ACETAMINOPHEN 650 MG: 325 TABLET ORAL at 06:23

## 2020-11-13 RX ADMIN — OXYCODONE HYDROCHLORIDE 5 MG: 5 TABLET ORAL at 00:52

## 2020-11-13 ASSESSMENT — PAIN DESCRIPTION - FREQUENCY: FREQUENCY: CONTINUOUS

## 2020-11-13 ASSESSMENT — PAIN - FUNCTIONAL ASSESSMENT: PAIN_FUNCTIONAL_ASSESSMENT: ACTIVITIES ARE NOT PREVENTED

## 2020-11-13 ASSESSMENT — PAIN DESCRIPTION - PAIN TYPE: TYPE: SURGICAL PAIN

## 2020-11-13 ASSESSMENT — PAIN SCALES - GENERAL
PAINLEVEL_OUTOF10: 0
PAINLEVEL_OUTOF10: 4
PAINLEVEL_OUTOF10: 9
PAINLEVEL_OUTOF10: 6

## 2020-11-13 ASSESSMENT — PAIN DESCRIPTION - ORIENTATION: ORIENTATION: MID

## 2020-11-13 ASSESSMENT — PAIN DESCRIPTION - DESCRIPTORS: DESCRIPTORS: DISCOMFORT;CRAMPING;NAGGING

## 2020-11-13 ASSESSMENT — PAIN DESCRIPTION - LOCATION: LOCATION: THROAT

## 2020-11-13 ASSESSMENT — PAIN DESCRIPTION - ONSET: ONSET: ON-GOING

## 2020-11-13 NOTE — PLAN OF CARE
Problem: Pain:  Goal: Pain level will decrease  Description: Pain level will decrease  Outcome: Met This Shift     Problem: Pain:  Goal: Control of acute pain  Description: Control of acute pain  Outcome: Met This Shift     Problem: Infection - Surgical Site:  Goal: Will show no infection signs and symptoms  Description: Will show no infection signs and symptoms  Outcome: Met This Shift

## 2020-11-13 NOTE — PROGRESS NOTES
[facility] Quality Flow/Interdisciplinary Rounds Progress Note        Quality Flow Rounds held on November 13, 2020    Disciplines Attending:  Bedside Nurse, ,  and Nursing Unit Leadership    Earnest Haskins was admitted on 11/12/2020  6:57 AM    Anticipated Discharge Date:  Expected Discharge Date: 11/13/20    Disposition:    John Score:  John Scale Score: 20    Readmission Risk              Risk of Unplanned Readmission:        0           Discussed patient goal for the day, patient clinical progression, and barriers to discharge.   The following Goal(s) of the Day/Commitment(s) have been identified:  Discharge Planning      Benita Morales  November 13, 2020

## 2020-11-13 NOTE — OP NOTE
[id number] [address]                                OPERATIVE REPORT    PATIENT NAME: Abigail Avelar                 :        1985  MED REC NO:   [id number]                            ROOM:       0955  ACCOUNT NO:   [de-identified]                           ADMIT DATE: 2020  PROVIDER:     Deb Lerner DO    DATE OF PROCEDURE:  2020    PREOPERATIVE DIAGNOSIS:  Left thyroid nodule. POSTOPERATIVE DIAGNOSIS:  Left thyroid nodule. PROCEDURE PERFORMED:  Total thyroidectomy with central neck dissection  with nerve integrity monitoring system. SURGEON:  Karishma Chapin MD    ASSISTANT:  Deb Lerner DO, PGY-2    ANESTHESIA:  General endotracheal intubation with nerve integrity  monitoring. FLUIDS:  IV crystalloid. ESTIMATED BLOOD LOSS:  Less than 50 mL. SPECIMENS:  Left thyroid lobe, right thyroid lobe and isthmus with  central neck contents. FINDINGS:  roughly 2.5 cm hard left thyroid nodule. INDICATIONS FOR PROCEDURE:  This is a 78-year-old female with history of  enlarging left-sided nodule. Previous FNA left thyroid nodulewas benign. Risks  and benefits of the procedure were discussed, but not limited to  bleeding, infection, damage to the adjacent structures, damage to the  recurrent laryngeal nerve, hoarseness of voice, need for further  procedures. The patient understood and agreed to proceed with the  procedure. DESCRIPTION OF PROCEDURE:  The patient was brought back to the operating  room and placed supine on the table. SCDs were placed and functioning. The patient was then handed to Anesthesia for proper endotracheal  intubation. Over the projected surgical incision, 1% lidocaine with  epinephrine was then injected into the skin. The patient was then  prepped and draped in a sterile fashion.   Using a 15-blade scalpel, an  incision was made down to the platysma layer. Bleeding was controlled with  bipolar cautery along the way. A superior subplatysmal flap was then  raised to the thyroid notch using monopolar cautery and inferior subplatysmal flap was then  raised to the sternal notch. The midline raphe was then identified and  divided until the thyroid lobe was visualized. The left lateral strap muscles  were then divided with Harmonic scalpel. Attention was first turned to  the left thyroid lobe. With careful dissection, the inferior portion of  the left thyroid lobe was dissected. The inferior thyroid vessels were  then ligated with Harmonic scalpel and tied with a 2-0 silk tie. Dissection was then carried around the lateral aspect of the left  thyroid lobe. As the lateral aspect of the left thyroid lobe was  retracted medially dissection was carried along the posterior border  of the thyroid gland. The recurrent laryngeal nerve on the left side  was then identified. This was stimulated intact. Recurrent laryngeal  nerve was then further dissected off of the thyroid gland. This was stimulated again and noted to be  intact. The superior portion of the left thyroid gland was then further  dissected. The superior thyroid vessels were then dissected and ligated  using a Harmonic scalpel as well as a 2-0 silk tie. Other vessels  throughout the dissection were tied off with a 2-0 silk tie, and bipolar cautery. The  recurrent laryngeal nerve was then stimulated again and identified to be  intact. Upon observation of the surgical field, the superior  parathyroid gland as well as the inferior parathyroid gland were noted  to be intact; the superior parathyroid gland was noted to be  somewhat dusky. The left thyroid gland was then divided at the isthmus and taken off the trachea through Valentine's  ligament using monopolar Bovie cautery. This was handed off the field and sent for intraoperative pathologic  review.   Upon intraoperative pathologic frozen section, the nodule came  back as papillary thyroid carcinoma. The patient's  was then  notified and agreed to continue with dissection and removal of the right  thyroid gland. Attention was then turned to the right thyroid gland. Dissection was first carried around the inferior portion of the thyroid  gland. The inferior thyroid vessels were then identified and ligated  with Harmonic scalpel and a 2-0 silk tie. Dissection was carried along  the lateral portion of the right thyroid gland. As the lateral portion  of the right thyroid gland was retracted medially, the posterior aspect  of the right thyroid gland was further dissected. The recurrent  laryngeal nerve was then identified going into the right cricothyroid  joint. This was stimulated and noted to be intact. Dissection was  carried up the thyroid gland and in the direction of the recurrent  laryngeal nerve until the posterior portion of the thyroid gland was  released from the recurrent laryngeal nerve. Dissection was  then carried along the superior portion of the thyroid gland on the  right side. The superior thyroid vessels were then encountered. These  were ligated with Harmonic scalpel and a 2-0 silk tie. Other vessels  along the way were ligated and tied with a 2-0 silk tie and bipolar cautery. Once the  superior portion of the thyroid gland was released, the recurrent  laryngeal nerve was stimulated and noted to be intact. With the thymus  and right thyroid gland together, using monopolar Bovie cautery, the  right thyroid lobe and isthmus were then taken off of the trachea by  Releasing Berry's ligament and the thyroid was delivered off of the field. The  right thyroid gland and isthmus were then handed off the field for  pathologic review. The recurrent laryngeal nerve was then stimulated on  the right side and noted to be intact. The surgical defect was then  irrigated with normal saline.   The patient was Valsalva'd up to 36 mmHg,  and no bleeding was seen. 2 gm of Kimberly was placed into the patient's  surgical defect. A round drain was then placed into the surgical defect and sutured to  the skin using a 2-0 nylon stitch. The strap muscles were then reapproximated using 3-0  Vicryl suture. The subplatysmal and deep dermal layer were then sutured  in a simple interrupted fashion using 3-0 Vicryl stitch. 4-0 Monocryl  was then used to suture the skin layer in a subcuticular fashion. Mastisol and Steris were then  applied over the surgical incision. The patient was given back to  Anesthesia for proper awakening. The patient tolerated the procedure  without complication. Dr. Rahat [address] was present and scrubbed for the  entire case. Upon waking from anesthesia, the patient was noted to have  a strong voice.         Rebecca Ramsey DO    D: 11/12/2020 12:30:47       T: 11/12/2020 15:40:21     [email or web address]  Job#: [id number]     Doc#: [id number]    CC:

## 2020-11-13 NOTE — DISCHARGE SUMMARY
Physician Discharge Summary     Daron Lopez  [id number]    Admit date: 11/12/2020    Discharge date and time: No discharge date for patient encounter. Admitting Physician: Jennifer Brown MD     Admission Diagnoses:   Patient Active Problem List   Diagnosis    Internal derangement of left knee    Papillary thyroid carcinoma [facility])       Discharge Diagnoses:   Patient Active Problem List   Diagnosis    Internal derangement of left knee    Papillary thyroid carcinoma [facility])         Hospital Course: Daron Lopez is a 30 y.o. female who ENT performed total thyroidectomy. She had an otherwise uneventful course and progressed well. Pain was controlled. She was tolerating a regular diet with no nausea or vomiting, was ambulating well, and was in a suitable condition for discharge to home. Lab Results   Component Value Date    WBC 8.5 11/12/2020    HGB 13.7 11/12/2020     11/12/2020     11/12/2020     11/12/2020    K 4.7 11/12/2020    BUN 11 11/12/2020    CREATININE 0.7 11/12/2020    GLUCOSE 102 11/12/2020    LABGLOM >60 11/12/2020    LABALBU 4.1 11/12/2020    PROT 7.2 11/12/2020    CALCIUM 8.8 11/13/2020    BILITOT 0.4 11/12/2020    ALKPHOS 60 11/12/2020    AST 28 11/12/2020    ALT 19 11/12/2020       Discharge Exam:   VITALS: BP (!) 119/58   Pulse 72   Temp 97.8 °F (36.6 °C) (Oral)   Resp 16   Ht 5' 6\" (1.676 m)   Wt 150 lb 3.2 oz (68.1 kg)   LMP 10/22/2020   SpO2 96%   BMI 24.24 kg/m²     General appearance: alert, appears stated age and cooperative  Head: Normocephalic, without obvious abnormality, atraumatic  Neck: Incision is clean, dry, intact. No evidence of hematoma or infection.     Lungs: clear to auscultation bilaterally  Heart: regular rate and rhythm  Abdomen: soft, non-tender; bowel sounds normal; no masses,  no organomegaly  Extremities: extremities normal, atraumatic, no cyanosis or edema    Disposition: home       Medication List      START taking these

## 2020-11-13 NOTE — ANESTHESIA POSTPROCEDURE EVALUATION
Department of Anesthesiology  Postprocedure Note    Patient: Cheli Emery  MRN: [id number]  YOB: 1985  Date of evaluation: 11/12/2020  Time:  7:57 PM     Procedure Summary     Date:  11/12/20 Room / Location:  [facility] 01 / [facility]    Anesthesia Start:  4834 Anesthesia Stop:  1216    Procedure:  TOTAL THYROIDECTOMY, CENTRAL NECK DISSECTION WITH NERVE INTEGRITY MONITOR (Bilateral Neck) Diagnosis:  (LEFT THYROID GOITER)    Surgeon:  Mark Ruff MD Responsible Provider:  Tiesha Flores MD    Anesthesia Type:  general ASA Status:  2          Anesthesia Type: general    Merly Phase I: Merly Score: 10    Merly Phase II:      Last vitals: Reviewed and per EMR flowsheets.        Anesthesia Post Evaluation    Patient location during evaluation: PACU  Patient participation: complete - patient participated  Level of consciousness: awake  Airway patency: patent  Nausea & Vomiting: no vomiting and no nausea  Complications: no  Cardiovascular status: hemodynamically stable  Respiratory status: acceptable  Hydration status: stable

## 2020-12-03 PROBLEM — C44.92: Status: ACTIVE | Noted: 2020-12-03

## 2020-12-31 ENCOUNTER — TELEPHONE (OUTPATIENT)
Dept: CASE MANAGEMENT | Age: 35
End: 2020-12-31

## 2021-01-12 ENCOUNTER — HOSPITAL ENCOUNTER (OUTPATIENT)
Dept: ULTRASOUND IMAGING | Age: 36
Discharge: HOME | End: 2021-01-14
Payer: COMMERCIAL

## 2021-01-12 VITALS — BODY MASS INDEX: 23.78 KG/M2 | HEIGHT: 66 IN | WEIGHT: 148 LBS

## 2021-01-12 PROCEDURE — 6360000002 HC RX W HCPCS: Performed by: RADIOLOGY

## 2021-01-12 RX ADMIN — THYROTROPIN ALFA 0.9 MG: 0.9 INJECTION, POWDER, FOR SOLUTION INTRAMUSCULAR at 09:43

## 2021-01-12 NOTE — PROGRESS NOTES
Patient arrives to IR for intramuscular injection of Thyrogen 0.9 mg.  Patient was identified via 2 patient identifiers and arrived to pre-work up area in stable condition, ambulatory. The patient declined pregnancy test and signed a pregnancy waiver at this time. The patient's height and weight were recorded. She has no known allergies. The patient has been NPO since midnight. The procedure was explained and the patient gave consent for the injection. I mixed sterile saline 1.2 mL with the powdered Thyrogen and injected Thyrogen 0.9 mg/1mL into the left ventrogluteal muscle. A Band Aid was placed. The patient was monitored for 10 minutes after administration with no ill effects. Discharge instructions were given including signs and symptoms of infection, allergic reaction, and who to call with any questions. She will return tomorrow for her second dose of Thyrogen to be administered. The patient left the department in stable condition, ambulating independently.

## 2021-01-13 ENCOUNTER — HOSPITAL ENCOUNTER (OUTPATIENT)
Dept: INTERVENTIONAL RADIOLOGY/VASCULAR | Age: 36
Discharge: HOME | End: 2021-01-15
Payer: COMMERCIAL

## 2021-01-13 DIAGNOSIS — C37 MALIGNANT NEOPLASM OF THYMUS (HCC): ICD-10-CM

## 2021-01-13 LAB — HCG QUALITATIVE: NEGATIVE

## 2021-01-13 PROCEDURE — 36415 COLL VENOUS BLD VENIPUNCTURE: CPT

## 2021-01-13 PROCEDURE — 84703 CHORIONIC GONADOTROPIN ASSAY: CPT

## 2021-01-13 NOTE — PROGRESS NOTES
Patient arrives to IR for the second dose,  intramuscular injection of Thyrogen 0.9 mg.  Patient was identified via 2 patient identifiers and arrived to pre-work up area in stable condition, ambulatory. She has no known allergies. The patient has been NPO since midnight. The procedure was explained and the patient gave consent for the injection. I mixed sterile saline 1.2 mL with the powdered Thyrogen and injected Thyrogen 0.9 mg/1mL into the right gluteal muscle. A Band Aid was placed. The patient was monitored for 10 minutes after administration with no ill effects. A serum pregnancy was drawn in yee of tomorrow's procedure. Discharge instructions were given including signs and symptoms of infection, allergic reaction, and who to call with any questions.       The patient left the department in stable condition, ambulating independently

## 2021-01-14 ENCOUNTER — HOSPITAL ENCOUNTER (OUTPATIENT)
Dept: NUCLEAR MEDICINE | Age: 36
Discharge: HOME | End: 2021-01-16
Payer: COMMERCIAL

## 2021-01-14 DIAGNOSIS — C73 THYROID CANCER (HCC): ICD-10-CM

## 2021-01-14 PROCEDURE — A9517 I131 IODIDE CAP, RX: HCPCS | Performed by: RADIOLOGY

## 2021-01-14 PROCEDURE — 79005 NUCLEAR RX ORAL ADMIN: CPT | Performed by: RADIOLOGY

## 2021-01-14 PROCEDURE — 3430000000 HC RX DIAGNOSTIC RADIOPHARMACEUTICAL: Performed by: RADIOLOGY

## 2021-01-14 PROCEDURE — 79005 NUCLEAR RX ORAL ADMIN: CPT

## 2021-01-14 RX ADMIN — SODIUM IODIDE I 131 108.3 MILLICURIE: 1 CAPSULE, GELATIN COATED ORAL at 09:58

## 2021-01-20 DIAGNOSIS — C73: Primary | ICD-10-CM

## 2021-01-21 ENCOUNTER — HOSPITAL ENCOUNTER (OUTPATIENT)
Dept: NUCLEAR MEDICINE | Age: 36
Discharge: HOME | End: 2021-01-23
Payer: COMMERCIAL

## 2021-01-21 DIAGNOSIS — C73: ICD-10-CM

## 2021-01-21 PROCEDURE — 78018 THYROID MET IMAGING BODY: CPT | Performed by: RADIOLOGY

## 2021-01-21 PROCEDURE — 78018 THYROID MET IMAGING BODY: CPT

## 2021-02-07 ENCOUNTER — APPOINTMENT (OUTPATIENT)
Dept: CT IMAGING | Age: 36
End: 2021-02-07
Payer: COMMERCIAL

## 2021-02-07 ENCOUNTER — HOSPITAL ENCOUNTER (EMERGENCY)
Age: 36
Discharge: HOME | End: 2021-02-07
Payer: COMMERCIAL

## 2021-02-07 ENCOUNTER — APPOINTMENT (OUTPATIENT)
Dept: GENERAL RADIOLOGY | Age: 36
End: 2021-02-07
Payer: COMMERCIAL

## 2021-02-07 VITALS
TEMPERATURE: 97.4 F | RESPIRATION RATE: 16 BRPM | HEART RATE: 89 BPM | DIASTOLIC BLOOD PRESSURE: 79 MMHG | BODY MASS INDEX: 24.11 KG/M2 | OXYGEN SATURATION: 98 % | WEIGHT: 150 LBS | HEIGHT: 66 IN | SYSTOLIC BLOOD PRESSURE: 145 MMHG

## 2021-02-07 DIAGNOSIS — S89.92XA INJURY OF LEFT KNEE, INITIAL ENCOUNTER: Primary | ICD-10-CM

## 2021-02-07 DIAGNOSIS — S89.92XA INJURY OF LIGAMENT OF LEFT KNEE, INITIAL ENCOUNTER: ICD-10-CM

## 2021-02-07 DIAGNOSIS — M24.20 LIGAMENT LAXITY: ICD-10-CM

## 2021-02-07 PROCEDURE — 96372 THER/PROPH/DIAG INJ SC/IM: CPT

## 2021-02-07 PROCEDURE — 73700 CT LOWER EXTREMITY W/O DYE: CPT

## 2021-02-07 PROCEDURE — 73562 X-RAY EXAM OF KNEE 3: CPT

## 2021-02-07 PROCEDURE — 99283 EMERGENCY DEPT VISIT LOW MDM: CPT

## 2021-02-07 PROCEDURE — 6360000002 HC RX W HCPCS: Performed by: PHYSICIAN ASSISTANT

## 2021-02-07 RX ORDER — KETOROLAC TROMETHAMINE 30 MG/ML
60 INJECTION, SOLUTION INTRAMUSCULAR; INTRAVENOUS ONCE
Status: COMPLETED | OUTPATIENT
Start: 2021-02-07 | End: 2021-02-07

## 2021-02-07 RX ORDER — OXYCODONE HYDROCHLORIDE AND ACETAMINOPHEN 5; 325 MG/1; MG/1
1 TABLET ORAL EVERY 6 HOURS PRN
Qty: 20 TABLET | Refills: 0 | Status: SHIPPED | OUTPATIENT
Start: 2021-02-07 | End: 2021-02-12

## 2021-02-07 RX ORDER — NAPROXEN 500 MG/1
500 TABLET ORAL 2 TIMES DAILY
Qty: 60 TABLET | Refills: 0 | Status: ON HOLD | OUTPATIENT
Start: 2021-02-07 | End: 2022-07-24 | Stop reason: HOSPADM

## 2021-02-07 RX ADMIN — KETOROLAC TROMETHAMINE 60 MG: 30 INJECTION, SOLUTION INTRAMUSCULAR at 11:28

## 2021-02-07 ASSESSMENT — PAIN SCALES - GENERAL: PAINLEVEL_OUTOF10: 10

## 2021-02-07 ASSESSMENT — PAIN DESCRIPTION - ORIENTATION: ORIENTATION: LEFT

## 2021-02-07 NOTE — ED PROVIDER NOTES
[facility]     Department of Emergency Medicine   ED  Provider Note  Admit Date/RoomTime: 2/7/2021 11:13 AM  ED Room: 37/37    Chief Complaint   Fall (pt fell in snow last night and states during fall she heard a \"pop\" in left knee. having severe pain in this area. ) and Knee Pain    History of Present Illness      Kathy Ayers is a 30 y.o. old female who presents to the emergency department for left knee pain. Patient does report a history of almost complete ACL tear. She was supposed to have surgery but was diagnosed with thyroid cancer and had to get her thyroid removed. She states she had a fall last night. She slipped in the snow and heard a \"pop\" in her left knee. She is having significant pain to her left knee that is worse with movement or bending. She denies any numbness/tingling or sensation changes. She denies any pain to her ankle, foot, or hip of affected extremity. She did not hit her head or have any loss of consciousness during this fall. She has no other complaints or concerns at this ED visit. Patient is alert and oriented x3 and in no apparent distress. She is nontoxic-appearing. PCP: Dr. Claudia Briscoe  Ortho: Dr. Reed SANCHEZ   Pertinent positives and negatives are stated within HPI, all other systems reviewed and are negative. Past Medical History:   Past Medical History:   Diagnosis Date    Thyroid disease     nodule- [id number]       Past Surgical History:  has a past surgical history that includes Foot surgery (Right, 2019) and Thyroidectomy (Bilateral, 11/12/2020). Social History:  reports that she has been smoking cigarettes. She has been smoking about 0.50 packs per day. She has never used smokeless tobacco. She reports current alcohol use. She reports that she does not use drugs. Family History: family history includes Heart Disease in her father. Allergies: Patient has no known allergies.     Physical Exam     Vitals:    02/07/21 1144   BP: (!) 145/79 Pulse: 89   Resp: 16   Temp: 97.4 °F (36.3 °C)   TempSrc: Temporal   SpO2: 98%   Weight: 150 lb (68 kg)   Height: 5' 6\" (1.676 m)     Oxygen Saturation Interpretation: Normal.    Constitutional:  Alert, development consistent with age. NAD  HEENT:  NC/NT. Airway patent. Neck:  Normal ROM. Supple. Non-tender  Back: No lumbar tenderness. Knee:  Left lateral, anterior:             Tenderness: Moderate to severe tenderness with palpation, maximal tenderness to anterior-lateral             Swelling/Effusion: Moderate. Deformity: no.              ROM: diminished range with pain. Skin:  no erythema, rash or open wounds noted. Drawer's: Positive posterior and anterior   Lachman's: Positive. Crepitus: Positive. Integument:  Normal turgor. Warm, dry, without visible rash, unless noted elsewhere. Neurological: Motor functions intact. Lab / Imaging Results   (All laboratory and radiology results have been personally reviewed by myself)  Labs:  No results found for this visit on 02/07/21. Imaging: All Radiology results interpreted by Radiologist unless otherwise noted. CT KNEE LEFT WO CONTRAST   Final Result   No evidence for acute fracture or dislocation of the knee. Large patellofemoral joint effusion which may indicate underlying soft   tissue/ligamentous injury. XR KNEE LEFT (3 VIEWS)   Final Result   There is a vertical line of a low-density in the proximal left tibial shaft. The even though not seen on other projections a cannot entirely exclude a   vertical oriented fracture but difficult to be separate from underline fat   plane interfaces. In presence of trauma and in presence of positive clinical symptomatology to   the proximal left tibia including point tenderness can further evaluate with   CT scan of the left knee.         ED Course / Medical Decision Making     Medications   ketorolac (TORADOL) injection 60 mg (60 mg Intramuscular Given 2/7/21 1123) Consult(s):  None    Procedure(s):  none    MDM:       Counseling: The emergency provider has spoken with the patient or caregiver and discussed todays results, in addition to providing specific details for the plan of care and counseling regarding the diagnosis and prognosis. Questions are answered at this time and they are agreeable with the plan. Patient understands they must follow-up with orthopedics. RICE discussed. They were educated on signs and symptoms that would require emergent return. Patient was educated on newly prescribed medications. They were also instructed on knee brace use and care. Patient states she has crutches at home. Assessment      1. Injury of left knee, initial encounter    2. Ligament laxity    3. Injury of ligament of left knee, initial encounter      Plan   Discharge to home  Patient condition is good    New Medications     Discharge Medication List as of 2/7/2021  1:35 PM      START taking these medications    Details   oxyCODONE-acetaminophen (PERCOCET) 5-325 MG per tablet Take 1 tablet by mouth every 6 hours as needed for Pain for up to 5 days. , Disp-20 tablet, R-0Print      naproxen (NAPROSYN) 500 MG tablet Take 1 tablet by mouth 2 times daily, Disp-60 tablet, R-0Print             Electronically signed by Carmen Avalos PA-C   DD: 2/7/21  **This report was transcribed using voice recognition software. Every effort was made to ensure accuracy; however, inadvertent computerized transcription errors may be present.     END OF ED PROVIDER NOTE        Carmen Avalos PA-C  02/07/21 0094

## 2021-04-06 ENCOUNTER — HOSPITAL ENCOUNTER (OUTPATIENT)
Age: 36
Discharge: HOME | End: 2021-04-06
Payer: COMMERCIAL

## 2021-04-06 LAB
T4 TOTAL: 4.9 MCG/DL (ref 4.5–11.7)
TSH SERPL DL<=0.05 MIU/L-ACNC: 70.67 UIU/ML (ref 0.27–4.2)

## 2021-04-06 PROCEDURE — 84436 ASSAY OF TOTAL THYROXINE: CPT

## 2021-04-06 PROCEDURE — 36415 COLL VENOUS BLD VENIPUNCTURE: CPT

## 2021-04-06 PROCEDURE — 84482 T3 REVERSE: CPT

## 2021-04-06 PROCEDURE — 84443 ASSAY THYROID STIM HORMONE: CPT

## 2021-04-10 LAB — T3 REVERSE: 7.9 NG/DL (ref 9–27)

## 2021-12-07 ENCOUNTER — HOSPITAL ENCOUNTER (OUTPATIENT)
Dept: ULTRASOUND IMAGING | Age: 36
Discharge: HOME | End: 2021-12-09
Payer: COMMERCIAL

## 2021-12-07 ENCOUNTER — HOSPITAL ENCOUNTER (OUTPATIENT)
Age: 36
Discharge: HOME | End: 2021-12-09
Payer: COMMERCIAL

## 2021-12-07 DIAGNOSIS — Z34.90 PREGNANCY AT EARLY STAGE: ICD-10-CM

## 2021-12-07 PROCEDURE — 76817 TRANSVAGINAL US OBSTETRIC: CPT

## 2022-01-20 ENCOUNTER — HOSPITAL ENCOUNTER (OUTPATIENT)
Age: 37
Discharge: HOME | End: 2022-01-20

## 2022-01-26 ENCOUNTER — HOSPITAL ENCOUNTER (OUTPATIENT)
Age: 37
Discharge: HOME | End: 2022-01-26
Payer: COMMERCIAL

## 2022-01-26 LAB
T3 FREE: 1.8 PG/ML (ref 2–4.4)
T3 TOTAL: 125.9 NG/DL (ref 80–200)
T4 FREE: 0.95 NG/DL (ref 0.93–1.7)
T4 TOTAL: 10.3 MCG/DL (ref 4.5–11.7)
TSH SERPL DL<=0.05 MIU/L-ACNC: 52.04 UIU/ML (ref 0.27–4.2)

## 2022-01-26 PROCEDURE — 86800 THYROGLOBULIN ANTIBODY: CPT

## 2022-01-26 PROCEDURE — 86376 MICROSOMAL ANTIBODY EACH: CPT

## 2022-01-26 PROCEDURE — 36415 COLL VENOUS BLD VENIPUNCTURE: CPT

## 2022-01-26 PROCEDURE — 84443 ASSAY THYROID STIM HORMONE: CPT

## 2022-01-26 PROCEDURE — 84439 ASSAY OF FREE THYROXINE: CPT

## 2022-01-26 PROCEDURE — 84481 FREE ASSAY (FT-3): CPT

## 2022-01-31 LAB
THYROGLOBULIN ANTIBODY: 18 IU/ML (ref 0–40)
THYROID PEROXIDASE (TPO) ABS: 5 IU/ML (ref 0–25)

## 2022-04-12 ENCOUNTER — HOSPITAL ENCOUNTER (OUTPATIENT)
Age: 37
Discharge: HOME | End: 2022-04-12
Payer: COMMERCIAL

## 2022-04-12 LAB
T3 FREE: 3.2 PG/ML (ref 2–4.4)
T3 TOTAL: 218 NG/DL (ref 80–200)
T4 FREE: 1.63 NG/DL (ref 0.93–1.7)
T4 TOTAL: 16.6 MCG/DL (ref 4.5–11.7)
TSH SERPL DL<=0.05 MIU/L-ACNC: 1.35 UIU/ML (ref 0.27–4.2)

## 2022-04-12 PROCEDURE — 84443 ASSAY THYROID STIM HORMONE: CPT

## 2022-04-12 PROCEDURE — 36415 COLL VENOUS BLD VENIPUNCTURE: CPT

## 2022-04-12 PROCEDURE — 84481 FREE ASSAY (FT-3): CPT

## 2022-04-12 PROCEDURE — 84439 ASSAY OF FREE THYROXINE: CPT

## 2022-04-12 PROCEDURE — 86800 THYROGLOBULIN ANTIBODY: CPT

## 2022-04-12 PROCEDURE — 86376 MICROSOMAL ANTIBODY EACH: CPT

## 2022-04-16 LAB
THYROGLOBULIN ANTIBODY: <12 IU/ML (ref 0–40)
THYROID PEROXIDASE (TPO) ABS: <4 IU/ML (ref 0–25)

## 2022-06-06 ENCOUNTER — HOSPITAL ENCOUNTER (OUTPATIENT)
Age: 37
Discharge: HOME | End: 2022-06-06
Payer: COMMERCIAL

## 2022-06-06 LAB
T3 FREE: 2.9 PG/ML (ref 2–4.4)
T3 TOTAL: 184.6 NG/DL (ref 80–200)
T4 FREE: 1.42 NG/DL (ref 0.93–1.7)
T4 TOTAL: 15 MCG/DL (ref 4.5–11.7)
TSH SERPL DL<=0.05 MIU/L-ACNC: 5.24 UIU/ML (ref 0.27–4.2)

## 2022-06-06 PROCEDURE — 86800 THYROGLOBULIN ANTIBODY: CPT

## 2022-06-06 PROCEDURE — 86376 MICROSOMAL ANTIBODY EACH: CPT

## 2022-06-06 PROCEDURE — 84432 ASSAY OF THYROGLOBULIN: CPT

## 2022-06-06 PROCEDURE — 84481 FREE ASSAY (FT-3): CPT

## 2022-06-06 PROCEDURE — 36415 COLL VENOUS BLD VENIPUNCTURE: CPT

## 2022-06-06 PROCEDURE — 84439 ASSAY OF FREE THYROXINE: CPT

## 2022-06-06 PROCEDURE — 84443 ASSAY THYROID STIM HORMONE: CPT

## 2022-06-09 LAB
THYROGLOBULIN AB: <0.9 IU/ML (ref 0–4)
THYROGLOBULIN ANTIBODY: <12 IU/ML (ref 0–40)
THYROGLOBULIN BY LC-MS/MS, SERUM/PLASMA: ABNORMAL NG/ML (ref 1.3–31.8)
THYROGLOBULIN, SERUM OR PLASMA: 0.1 NG/ML (ref 1.3–31.8)
THYROID PEROXIDASE (TPO) ABS: <4 IU/ML (ref 0–25)

## 2022-07-13 ENCOUNTER — HOSPITAL ENCOUNTER (OUTPATIENT)
Age: 37
Setting detail: OBSERVATION
Discharge: HOME | End: 2022-07-13
Attending: OBSTETRICS & GYNECOLOGY | Admitting: OBSTETRICS & GYNECOLOGY
Payer: COMMERCIAL

## 2022-07-13 VITALS
WEIGHT: 170 LBS | BODY MASS INDEX: 25.76 KG/M2 | HEIGHT: 68 IN | DIASTOLIC BLOOD PRESSURE: 75 MMHG | HEART RATE: 81 BPM | RESPIRATION RATE: 16 BRPM | SYSTOLIC BLOOD PRESSURE: 119 MMHG | TEMPERATURE: 98.2 F

## 2022-07-13 PROBLEM — R10.9 ABDOMINAL CRAMPING AFFECTING PREGNANCY, ANTEPARTUM: Status: ACTIVE | Noted: 2022-07-13

## 2022-07-13 PROBLEM — O26.899 ABDOMINAL CRAMPING AFFECTING PREGNANCY, ANTEPARTUM: Status: ACTIVE | Noted: 2022-07-13

## 2022-07-13 PROCEDURE — G0378 HOSPITAL OBSERVATION PER HR: HCPCS

## 2022-07-13 PROCEDURE — 99219 PR INITIAL OBSERVATION CARE/DAY 50 MINUTES: CPT | Performed by: NURSE PRACTITIONER

## 2022-07-13 RX ORDER — SODIUM CHLORIDE, SODIUM LACTATE, POTASSIUM CHLORIDE, AND CALCIUM CHLORIDE .6; .31; .03; .02 G/100ML; G/100ML; G/100ML; G/100ML
500 INJECTION, SOLUTION INTRAVENOUS ONCE
Status: DISCONTINUED | OUTPATIENT
Start: 2022-07-13 | End: 2022-07-13 | Stop reason: HOSPADM

## 2022-07-13 RX ORDER — SODIUM CHLORIDE, SODIUM LACTATE, POTASSIUM CHLORIDE, CALCIUM CHLORIDE 600; 310; 30; 20 MG/100ML; MG/100ML; MG/100ML; MG/100ML
INJECTION, SOLUTION INTRAVENOUS CONTINUOUS
Status: DISCONTINUED | OUTPATIENT
Start: 2022-07-13 | End: 2022-07-13 | Stop reason: HOSPADM

## 2022-07-13 NOTE — H&P
Department of Obstetrics and Gynecology  Labor and Delivery  Nurse practitioner Triage Note      SUBJECTIVE:  Richard Bradshaw is a 40 y.o. female, [id number], Patient's last menstrual period was 10/21/2021.,Estimated Date of Delivery: 7/28/22, 37w6d, with the complaint of ctx, and loss of mucous plug. Pt denies vb or decreased fm. Previous vaginal deliveries    Prenatal course: ama    MEDICAL HISTORY:      Diagnosis Date    Thyroid disease     nodule- [id number]        SURGICAL HISTORY:      Procedure Laterality Date    FOOT SURGERY Right 2019    THYROIDECTOMY Bilateral 11/12/2020    TOTAL THYROIDECTOMY, CENTRAL NECK DISSECTION WITH NERVE INTEGRITY MONITOR performed by Herminia Galarza MD at [facility] HISTORY      Problem Relation Age of Onset    Heart Disease Father        Medication prior to Admission:  Medications Prior to Admission: Prenatal MV-Min-Fe Fum-FA-DHA (PRENATAL 1 PO), Take by mouth  naproxen (NAPROSYN) 500 MG tablet, Take 1 tablet by mouth 2 times daily (Patient not taking: Reported on 7/13/2022)  calcium-vitamin D (OSCAL-500) 500-200 MG-UNIT per tablet, Take 1 tablet by mouth daily (Patient not taking: Reported on 7/13/2022)  levothyroxine (SYNTHROID) 100 MCG tablet, Take 1 tablet by mouth Daily (Patient taking differently: Take 200 mcg by mouth Daily )    ALLERGIES:  Patient has no known allergies.         Review of Systems:   Ears, nose, mouth, throat, and face: negative  Respiratory: negative  Cardiovascular: negative  Gastrointestinal: negative  Genitourinary:negative  Integument/breast: negative  Hematologic/lymphatic: negative  Musculoskeletal:negative  Neurological: negative  Behavioral/Psych: negative  Endocrine: negative  Allergic/Immunologic: negative    OBJECTIVE    Vitals:  /76   Pulse 78   Temp 98.2 °F (36.8 °C) (Oral)   Resp 16   Ht 5' 8\" (1.727 m)   Wt 170 lb (77.1 kg)   LMP 10/21/2021   BMI 25.85 kg/m²       NECK:  Supple, symmetrical, trachea midline, no adenopathy, thyroid symmetric, not enlarged and no tenderness, skin normal  LUNGS:  No increased work of breathing, good air exchange, clear to auscultation bilaterally, no crackles or wheezing  CARDIOVASCULAR:  normal S1 and S2  ABDOMEN:  Soft, nontender    Cervix:             Dilation:  fingertip         Effacement:  40         Station:  -3 cm         Consistency:  soft         Position:  posterior              Membranes:  Intact    Fetal heart rate:         Baseline Heart Rate:  125        Accelerations:  present       Decelerations: late       Variability:  moderate    Contraction frequency: 6 minutes        General Labs:    CBC:   Lab Results   Component Value Date/Time    WBC 8.5 11/12/2020 07:35 AM    RBC 4.58 11/12/2020 07:35 AM    HGB 13.7 11/12/2020 07:35 AM    HCT 40.8 11/12/2020 07:35 AM    MCV 89.1 11/12/2020 07:35 AM    RDW 13.2 11/12/2020 07:35 AM     11/12/2020 07:35 AM     CMP:    Lab Results   Component Value Date/Time     11/12/2020 07:35 AM    K 4.7 11/12/2020 07:35 AM     11/12/2020 07:35 AM    CO2 21 11/12/2020 07:35 AM    BUN 11 11/12/2020 07:35 AM    PROT 7.2 11/12/2020 07:35 AM     U/A:  No components found for: Mickeal Fears, USPGRAV, UPH, UPROTEIN, UGLUCOSE, UKETONE, UBILI, UBLOOD, UNITRITE, UUROBIL, ULEUKEST, USQEPI, URENEPI, UWBC, URBC, UBACTERIA, UHYALINE        ASSESSMENT & PLAN:   Discussed with dr Rodrick Garcia reviewed with house officer, questionable decels after long ctx.  Will continue to monitor  Iv fluids  Reevaluate in a couple hours

## 2022-07-13 NOTE — PROGRESS NOTES
presents to unit with c/ loss of mucous plug on  with contractrions. Denies vaginal bleeding, lof, and contractions. Perceives good fetal movement. EDC:22. 37 weeks 6 days gestation. Pt reports no complications with current pregnancy. Placed on efm. Call light within reach.

## 2022-07-20 ENCOUNTER — HOSPITAL ENCOUNTER (OUTPATIENT)
Age: 37
Setting detail: OBSERVATION
Discharge: HOME | DRG: 540 | End: 2022-07-20
Attending: OBSTETRICS & GYNECOLOGY | Admitting: OBSTETRICS & GYNECOLOGY
Payer: COMMERCIAL

## 2022-07-20 VITALS
SYSTOLIC BLOOD PRESSURE: 139 MMHG | DIASTOLIC BLOOD PRESSURE: 91 MMHG | WEIGHT: 175 LBS | HEIGHT: 68 IN | HEART RATE: 92 BPM | BODY MASS INDEX: 26.52 KG/M2

## 2022-07-20 PROBLEM — R10.9 ABDOMINAL CRAMPING: Status: ACTIVE | Noted: 2022-07-20

## 2022-07-20 LAB
AMPHETAMINE SCREEN, URINE: NOT DETECTED
BARBITURATE SCREEN URINE: NOT DETECTED
BENZODIAZEPINE SCREEN, URINE: NOT DETECTED
CANNABINOID SCREEN URINE: NOT DETECTED
COCAINE METABOLITE SCREEN URINE: NOT DETECTED
FENTANYL SCREEN, URINE: NOT DETECTED
Lab: NORMAL
METHADONE SCREEN, URINE: NOT DETECTED
OPIATE SCREEN URINE: NOT DETECTED
OXYCODONE URINE: NOT DETECTED
PHENCYCLIDINE SCREEN URINE: NOT DETECTED

## 2022-07-20 PROCEDURE — 80307 DRUG TEST PRSMV CHEM ANLYZR: CPT

## 2022-07-20 PROCEDURE — 99219 PR INITIAL OBSERVATION CARE/DAY 50 MINUTES: CPT | Performed by: NURSE PRACTITIONER

## 2022-07-20 PROCEDURE — 99211 OFF/OP EST MAY X REQ PHY/QHP: CPT

## 2022-07-20 NOTE — PROGRESS NOTES
Dr soni at bedside. Tracing and blood pressures reviewed by physician. Patient requesting to be induced. Per dr soni patient will be IOL with cytotec at 0700 tomorrow.  May be discharged home at this time

## 2022-07-20 NOTE — PROGRESS NOTES
Patient is a  that presents to l&d from dr Alden Suarez office with complaints of ctx, decreased fm, and nausea. Patient states ctx have been on going since this morning. FHTs were not checked in office, patient sent directly to delivery. Denies any vb or lof.  Efm applied

## 2022-07-20 NOTE — H&P
Department of Obstetrics and Gynecology  Labor and Delivery  NURSE PRACTITIONERTriage Note      SUBJECTIVE:  Aurora Padgett is a 40 y.o. female, Q4G3197, Patient's last menstrual period was 10/21/2021.,Estimated Date of Delivery: 7/28/22, 38w6d, with the complaint of cramping, decreased fm and nausea. Pt states she was at the office however they sent her straight here. Prenatal course: ama    MEDICAL HISTORY:      Diagnosis Date    Thyroid disease     nodule- [id number]        SURGICAL HISTORY:      Procedure Laterality Date    FOOT SURGERY Right 2019    THYROIDECTOMY Bilateral 11/12/2020    TOTAL THYROIDECTOMY, CENTRAL NECK DISSECTION WITH NERVE INTEGRITY MONITOR performed by Katharina Hartley MD at [facility] HISTORY      Problem Relation Age of Onset    Heart Disease Father        Medication prior to Admission:  Medications Prior to Admission: Prenatal MV-Min-Fe Fum-FA-DHA (PRENATAL 1 PO), Take by mouth  naproxen (NAPROSYN) 500 MG tablet, Take 1 tablet by mouth 2 times daily (Patient not taking: No sig reported)  calcium-vitamin D (OSCAL-500) 500-200 MG-UNIT per tablet, Take 1 tablet by mouth daily (Patient not taking: No sig reported)  levothyroxine (SYNTHROID) 100 MCG tablet, Take 1 tablet by mouth Daily (Patient not taking: Reported on 7/20/2022)    ALLERGIES:  Patient has no known allergies.         Review of Systems:   Ears, nose, mouth, throat, and face: negative  Respiratory: negative  Cardiovascular: negative  Gastrointestinal: negative  Genitourinary:negative  Integument/breast: negative  Hematologic/lymphatic: negative  Musculoskeletal:negative  Neurological: negative  Behavioral/Psych: negative  Endocrine: negative  Allergic/Immunologic: negative    OBJECTIVE    Vitals:  Ht 5' 8\" (1.727 m)   Wt 175 lb (79.4 kg)   LMP 10/21/2021   BMI 26.61 kg/m²       NECK:  Supple, symmetrical, trachea midline, no adenopathy, thyroid symmetric, not enlarged and no tenderness, skin

## 2022-07-20 NOTE — DISCHARGE INSTRUCTIONS
Home Undelivered Discharge Instructions    After Discharge Orders:    Future Appointments   Date Time Provider Juni Rose   7/21/2022  7:00 AM SEB L&D GEN RM 01 [facility] OP LD Jackie HOD                     Diet:  normal diet as tolerated    Rest: normal activity as tolerated    Other instructions: Do kick counts once a day on your baby. Choose the time of day your baby is most active. Get in a comfortable lying or sitting position and time how long it takes to feel 10 kicks, twists, turns, swishes, or rolls.  Call your physician or midwife if there have not been 10 kicks in 2 hours    Call physician or midwife, return to Labor and Delivery, call 911, or go to the nearest Emergency Room if: increased leakage or fluid, contractions more than  6 per  1 hour, decreased fetal movement, persistent low back pain or cramping, bleeding from vaginal area, difficulty urinating, pain with urination, difficulty breathing, new calf pain, persistent headache, or vision change

## 2022-07-20 NOTE — PROGRESS NOTES
Discharge instructions provided and educated to patient. Pt understood instructions. Notified patient to come back 7/21/22 for scheduled IOL. Provided signature.  Pt left unit via ambulation

## 2022-07-21 ENCOUNTER — HOSPITAL ENCOUNTER (INPATIENT)
Age: 37
LOS: 3 days | Discharge: HOME | DRG: 540 | End: 2022-07-24
Attending: OBSTETRICS & GYNECOLOGY | Admitting: OBSTETRICS & GYNECOLOGY
Payer: COMMERCIAL

## 2022-07-21 ENCOUNTER — ANESTHESIA EVENT (OUTPATIENT)
Dept: LABOR AND DELIVERY | Age: 37
DRG: 540 | End: 2022-07-21
Payer: COMMERCIAL

## 2022-07-21 ENCOUNTER — APPOINTMENT (OUTPATIENT)
Dept: LABOR AND DELIVERY | Age: 37
DRG: 540 | End: 2022-07-21
Payer: COMMERCIAL

## 2022-07-21 ENCOUNTER — ANESTHESIA (OUTPATIENT)
Dept: LABOR AND DELIVERY | Age: 37
DRG: 540 | End: 2022-07-21
Payer: COMMERCIAL

## 2022-07-21 DIAGNOSIS — G89.18 ACUTE POSTOPERATIVE PAIN OF ABDOMEN: Primary | ICD-10-CM

## 2022-07-21 DIAGNOSIS — R10.9 ACUTE POSTOPERATIVE PAIN OF ABDOMEN: Primary | ICD-10-CM

## 2022-07-21 PROBLEM — Z3A.39 39 WEEKS GESTATION OF PREGNANCY: Status: ACTIVE | Noted: 2022-07-21

## 2022-07-21 LAB
ABO/RH: NORMAL
AMPHETAMINE SCREEN, URINE: NOT DETECTED
ANTIBODY SCREEN: NORMAL
BARBITURATE SCREEN URINE: NOT DETECTED
BENZODIAZEPINE SCREEN, URINE: NOT DETECTED
CANNABINOID SCREEN URINE: NOT DETECTED
COCAINE METABOLITE SCREEN URINE: NOT DETECTED
FENTANYL SCREEN, URINE: NOT DETECTED
HCT VFR BLD CALC: 37.5 % (ref 34–48)
HEMOGLOBIN: 12.6 G/DL (ref 11.5–15.5)
Lab: NORMAL
MCH RBC QN AUTO: 30.1 PG (ref 26–35)
MCHC RBC AUTO-ENTMCNC: 33.6 % (ref 32–34.5)
MCV RBC AUTO: 89.7 FL (ref 80–99.9)
METHADONE SCREEN, URINE: NOT DETECTED
OPIATE SCREEN URINE: NOT DETECTED
OXYCODONE URINE: NOT DETECTED
PDW BLD-RTO: 14.3 FL (ref 11.5–15)
PHENCYCLIDINE SCREEN URINE: NOT DETECTED
PLATELET # BLD: 432 E9/L (ref 130–450)
PMV BLD AUTO: 10.7 FL (ref 7–12)
RBC # BLD: 4.18 E12/L (ref 3.5–5.5)
WBC # BLD: 15.6 E9/L (ref 4.5–11.5)

## 2022-07-21 PROCEDURE — 6360000002 HC RX W HCPCS

## 2022-07-21 PROCEDURE — 36415 COLL VENOUS BLD VENIPUNCTURE: CPT

## 2022-07-21 PROCEDURE — 86850 RBC ANTIBODY SCREEN: CPT

## 2022-07-21 PROCEDURE — 2580000003 HC RX 258: Performed by: OBSTETRICS & GYNECOLOGY

## 2022-07-21 PROCEDURE — 1220000001 HC SEMI PRIVATE L&D R&B

## 2022-07-21 PROCEDURE — 86900 BLOOD TYPING SEROLOGIC ABO: CPT

## 2022-07-21 PROCEDURE — 6370000000 HC RX 637 (ALT 250 FOR IP): Performed by: OBSTETRICS & GYNECOLOGY

## 2022-07-21 PROCEDURE — 85027 COMPLETE CBC AUTOMATED: CPT

## 2022-07-21 PROCEDURE — 86901 BLOOD TYPING SEROLOGIC RH(D): CPT

## 2022-07-21 PROCEDURE — 80307 DRUG TEST PRSMV CHEM ANLYZR: CPT

## 2022-07-21 PROCEDURE — 6360000002 HC RX W HCPCS: Performed by: OBSTETRICS & GYNECOLOGY

## 2022-07-21 RX ORDER — SODIUM CHLORIDE, SODIUM LACTATE, POTASSIUM CHLORIDE, CALCIUM CHLORIDE 600; 310; 30; 20 MG/100ML; MG/100ML; MG/100ML; MG/100ML
INJECTION, SOLUTION INTRAVENOUS CONTINUOUS
Status: DISCONTINUED | OUTPATIENT
Start: 2022-07-21 | End: 2022-07-24 | Stop reason: HOSPADM

## 2022-07-21 RX ORDER — METHYLERGONOVINE MALEATE 0.2 MG/ML
200 INJECTION INTRAVENOUS PRN
Status: DISCONTINUED | OUTPATIENT
Start: 2022-07-21 | End: 2022-07-24 | Stop reason: HOSPADM

## 2022-07-21 RX ORDER — ONDANSETRON 2 MG/ML
4 INJECTION INTRAMUSCULAR; INTRAVENOUS EVERY 6 HOURS PRN
Status: DISCONTINUED | OUTPATIENT
Start: 2022-07-21 | End: 2022-07-24 | Stop reason: HOSPADM

## 2022-07-21 RX ORDER — SODIUM CHLORIDE, SODIUM LACTATE, POTASSIUM CHLORIDE, AND CALCIUM CHLORIDE .6; .31; .03; .02 G/100ML; G/100ML; G/100ML; G/100ML
1000 INJECTION, SOLUTION INTRAVENOUS PRN
Status: DISCONTINUED | OUTPATIENT
Start: 2022-07-21 | End: 2022-07-24 | Stop reason: HOSPADM

## 2022-07-21 RX ORDER — CARBOPROST TROMETHAMINE 250 UG/ML
250 INJECTION, SOLUTION INTRAMUSCULAR PRN
Status: DISCONTINUED | OUTPATIENT
Start: 2022-07-21 | End: 2022-07-24 | Stop reason: HOSPADM

## 2022-07-21 RX ORDER — DOCUSATE SODIUM 100 MG/1
100 CAPSULE, LIQUID FILLED ORAL 2 TIMES DAILY
Status: DISCONTINUED | OUTPATIENT
Start: 2022-07-21 | End: 2022-07-22 | Stop reason: SDUPTHER

## 2022-07-21 RX ORDER — ACETAMINOPHEN 650 MG
TABLET, EXTENDED RELEASE ORAL
Status: DISPENSED
Start: 2022-07-21 | End: 2022-07-21

## 2022-07-21 RX ORDER — SODIUM CHLORIDE, SODIUM LACTATE, POTASSIUM CHLORIDE, AND CALCIUM CHLORIDE .6; .31; .03; .02 G/100ML; G/100ML; G/100ML; G/100ML
500 INJECTION, SOLUTION INTRAVENOUS PRN
Status: DISCONTINUED | OUTPATIENT
Start: 2022-07-21 | End: 2022-07-24 | Stop reason: HOSPADM

## 2022-07-21 RX ORDER — TERBUTALINE SULFATE 1 MG/ML
0.25 INJECTION, SOLUTION SUBCUTANEOUS ONCE
Status: COMPLETED | OUTPATIENT
Start: 2022-07-21 | End: 2022-07-22

## 2022-07-21 RX ORDER — SODIUM CHLORIDE 9 MG/ML
25 INJECTION, SOLUTION INTRAVENOUS PRN
Status: DISCONTINUED | OUTPATIENT
Start: 2022-07-21 | End: 2022-07-24 | Stop reason: HOSPADM

## 2022-07-21 RX ORDER — TERBUTALINE SULFATE 1 MG/ML
INJECTION, SOLUTION SUBCUTANEOUS
Status: COMPLETED
Start: 2022-07-21 | End: 2022-07-21

## 2022-07-21 RX ORDER — SODIUM CHLORIDE 0.9 % (FLUSH) 0.9 %
5-40 SYRINGE (ML) INJECTION PRN
Status: DISCONTINUED | OUTPATIENT
Start: 2022-07-21 | End: 2022-07-24 | Stop reason: HOSPADM

## 2022-07-21 RX ORDER — SODIUM CHLORIDE 0.9 % (FLUSH) 0.9 %
5-40 SYRINGE (ML) INJECTION EVERY 12 HOURS SCHEDULED
Status: DISCONTINUED | OUTPATIENT
Start: 2022-07-21 | End: 2022-07-24 | Stop reason: HOSPADM

## 2022-07-21 RX ORDER — MISOPROSTOL 200 UG/1
800 TABLET ORAL PRN
Status: DISCONTINUED | OUTPATIENT
Start: 2022-07-21 | End: 2022-07-24 | Stop reason: HOSPADM

## 2022-07-21 RX ORDER — LIDOCAINE HYDROCHLORIDE 10 MG/ML
INJECTION, SOLUTION INFILTRATION; PERINEURAL
Status: DISPENSED
Start: 2022-07-21 | End: 2022-07-21

## 2022-07-21 RX ADMIN — BUTORPHANOL TARTRATE 1 MG: 2 INJECTION, SOLUTION INTRAMUSCULAR; INTRAVENOUS at 20:53

## 2022-07-21 RX ADMIN — TERBUTALINE SULFATE 0.25 MG: 1 INJECTION, SOLUTION SUBCUTANEOUS at 18:15

## 2022-07-21 RX ADMIN — BUTORPHANOL TARTRATE 1 MG: 2 INJECTION, SOLUTION INTRAMUSCULAR; INTRAVENOUS at 15:06

## 2022-07-21 RX ADMIN — Medication 25 MCG: at 13:02

## 2022-07-21 RX ADMIN — SODIUM CHLORIDE, POTASSIUM CHLORIDE, SODIUM LACTATE AND CALCIUM CHLORIDE: 600; 310; 30; 20 INJECTION, SOLUTION INTRAVENOUS at 13:04

## 2022-07-21 RX ADMIN — BUTORPHANOL TARTRATE 1 MG: 2 INJECTION, SOLUTION INTRAMUSCULAR; INTRAVENOUS at 20:52

## 2022-07-21 RX ADMIN — BUTORPHANOL TARTRATE 1 MG: 2 INJECTION, SOLUTION INTRAMUSCULAR; INTRAVENOUS at 15:05

## 2022-07-21 RX ADMIN — Medication 25 MCG: at 09:02

## 2022-07-21 RX ADMIN — Medication 1 MILLI-UNITS/MIN: at 22:53

## 2022-07-21 ASSESSMENT — PAIN DESCRIPTION - LOCATION
LOCATION: ABDOMEN
LOCATION: ABDOMEN

## 2022-07-21 ASSESSMENT — PAIN SCALES - GENERAL
PAINLEVEL_OUTOF10: 7
PAINLEVEL_OUTOF10: 4

## 2022-07-21 ASSESSMENT — PAIN DESCRIPTION - DESCRIPTORS
DESCRIPTORS: CRAMPING;DISCOMFORT
DESCRIPTORS: DISCOMFORT;CRAMPING;TIGHTNESS

## 2022-07-21 ASSESSMENT — PAIN DESCRIPTION - ORIENTATION
ORIENTATION: LOWER
ORIENTATION: LOWER

## 2022-07-21 ASSESSMENT — LIFESTYLE VARIABLES: SMOKING_STATUS: 1

## 2022-07-21 NOTE — PROGRESS NOTES
Dr Brittanie Omalley called in for update. Notified cytotec #2 given at [phone]. Pt contraction q1-3min. Few questionable late decelerations noted on monitor, but recovered with repositioning. Updated on recent sve 2/60/-1. Orders for AROM and IUPC.

## 2022-07-21 NOTE — PROGRESS NOTES
Dr. Pearlean Dakin at bedside; verified position of baby and placenta via [address]. Performed AROM @ 1600 with thin meconium noted with IUPC placed.

## 2022-07-21 NOTE — PROGRESS NOTES
This RN at bedside. Second attempt to re-apply securement device, FSE not able to work at this time. External monitor re-applied. Pt states does not want another attempt at reinsertion of FSE.

## 2022-07-21 NOTE — PROGRESS NOTES
Department of Obstetrics and Gynecology  Labor and Delivery  Bedside Procedure Note        PROCEDURE PERFORMED: Artificial rupture of membrane and insertion of intrauterine pressure catheter    PRIMARY INDICATION: For better monitoring of the uterine contractions and to assess progress of labor      H&P STATUS: H&P was reviewed, the patient was examined with the following changes the patient has received some Stadol    Procedure explained to the patient. Procedure:   1. Ultrasound:   Presentation: Cephalic     placenta: Anterior     amniotic Fluid: Adequate amount   fetal Cardiac Activity: Present    Vaginal Exam: 2 cm, 70% effaced vertex presentation, -2 station    AROM: Performed. Amniotic Fluid; meconium stained fluid    2. IUPC: Inserted at 7 o'clock position until the 45 cm farnaz at the introitus    3.  Scalp Electrode: Not applied at this    Procedure well tolerated      COMPLICATIONS:  none

## 2022-07-21 NOTE — PROGRESS NOTES
I was asked to review the fetal heart tracing. The patient has not been on Pitocin. Contractions have been every 2 to 3 minutes. The fetal heart baseline is at 120 with some late decelerations noted. The patient would benefit of an njection of terbutaline to space those contractions and of a scalp.   Electrode application

## 2022-07-21 NOTE — PROGRESS NOTES
[id number]: FSE attempted by Dr James Beal . This RN at bedside attempting to re-apply securement device on FSE.

## 2022-07-21 NOTE — ANESTHESIA PRE PROCEDURE
Department of Anesthesiology  Preprocedure Note       Name:  Dao Garcia   Age:  40 y.o.  :  1985                                          MRN:  [id number]         Date:  2022      Surgeon: * No surgeons listed *    Procedure: * No procedures listed *    Medications prior to admission:   Prior to Admission medications    Medication Sig Start Date End Date Taking?  Authorizing Provider   Prenatal MV-Min-Fe Fum-FA-DHA (PRENATAL 1 PO) Take by mouth    Historical Provider, MD   naproxen (NAPROSYN) 500 MG tablet Take 1 tablet by mouth 2 times daily  Patient not taking: No sig reported 21   Shirin Martinez PA-C   calcium-vitamin D ([place]) 500-200 MG-UNIT per tablet Take 1 tablet by mouth daily  Patient not taking: No sig reported 20   Jack Colin DO   levothyroxine (SYNTHROID) 100 MCG tablet Take 1 tablet by mouth Daily  Patient not taking: No sig reported 20   Jack Colin DO       Current medications:    Current Facility-Administered Medications   Medication Dose Route Frequency Provider Last Rate Last Admin    lactated ringers infusion   IntraVENous Continuous Lorene Mcneil MD        lactated ringers bolus  500 mL IntraVENous PRN Lorene Mcneil MD        Or    lactated ringers bolus  1,000 mL IntraVENous PRN Lorene Mcneil MD        sodium chloride flush 0.9 % injection 5-40 mL  5-40 mL IntraVENous 2 times per day Lorene Mcneil MD        sodium chloride flush 0.9 % injection 5-40 mL  5-40 mL IntraVENous PRN Lorene Mcneil MD        0.9 % sodium chloride infusion  25 mL IntraVENous PRN Lorene Mcneil MD        methylergonovine (METHERGINE) injection 200 mcg  200 mcg IntraMUSCular PRDAPHNIE Mcneil MD        carboprost (HEMABATE) injection 250 mcg  250 mcg IntraMUSCular PRN Lorene Mcneil MD        miSOPROStol (CYTOTEC) tablet 800 mcg  800 mcg Rectal PRN Lorene Mcneil MD        tranexamic acid (CYCLOKAPRON) 1000 mg in sodium chloride 0.9 % 50 mL IVPB  1,000 mg IntraVENous Once PRN Ortiz Fried MD        [facility]ron [facility]) injection 4 mg  4 mg IntraVENous Q6H PRN Ortiz Fried MD        docusate sodium (COLACE) capsule 100 mg  100 mg Oral BID Ortiz Fried MD        butorphanol (STADOL) injection 1 mg  1 mg IntraVENous Q3H PRN Ortiz Fried MD        miSOPROStol (CYTOTEC) pre-split tablet TABS 25 mcg  25 mcg Vaginal Q4H Ortiz Fried MD   25 mcg at 07/21/22 0902    butorphanol (STADOL) injection 1 mg  1 mg IntraMUSCular Q3H PRN Ortiz Fried MD        povidone-iodine (BETADINE) 10 % external solution             lidocaine 1 % injection                Allergies:  No Known Allergies    Problem List:    Patient Active Problem List   Diagnosis Code    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    Papillary thyroid carcinoma (HCC) C73    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    Abdominal cramping affecting pregnancy, antepartum O26.899, R10.9    Abdominal cramping R10.9    39 weeks gestation of pregnancy Z3A.39       Past Medical History:        Diagnosis Date    Thyroid disease     nodule- AF59-34-08        Past Surgical History:        Procedure Laterality Date    FOOT SURGERY Right 2019    THYROIDECTOMY Bilateral 11/12/2020    TOTAL THYROIDECTOMY, CENTRAL NECK DISSECTION WITH NERVE INTEGRITY MONITOR performed by Morales Crocker MD at [address] [place] [address] History:    Social History     Tobacco Use    Smoking status: Every Day     Packs/day: 0.50     Types: Cigarettes    Smokeless tobacco: Never   Substance Use Topics    Alcohol use: Yes     Comment: occ                                Ready to quit: Not Answered  Counseling given: Not Answered      Vital Signs (Current):   Vitals:    07/21/22 0809 07/21/22 0840   BP: 122/77 136/82   Pulse: 75 72   Resp: 16    Temp: 36.9 °C (98.4 °F)    TempSrc: Oral    SpO2: 99%                                               BP Readings from Last 3 Encounters:   07/21/22 136/82   07/20/22 (!) 139/91   07/13/22 119/75       NPO Status:                                                                                 BMI:   Wt Readings from Last 3 Encounters:   07/20/22 175 lb (79.4 kg)   07/13/22 170 lb (77.1 kg)   02/07/21 150 lb (68 kg)     There is no height or weight on file to calculate BMI.    CBC:   Lab Results   Component Value Date/Time    WBC 15.6 07/21/2022 08:50 AM    RBC 4.18 07/21/2022 08:50 AM    HGB 12.6 07/21/2022 08:50 AM    HCT 37.5 07/21/2022 08:50 AM    MCV 89.7 07/21/2022 08:50 AM    RDW 14.3 07/21/2022 08:50 AM     07/21/2022 08:50 AM       CMP:   Lab Results   Component Value Date/Time     11/12/2020 07:35 AM    K 4.7 11/12/2020 07:35 AM     11/12/2020 07:35 AM    CO2 21 11/12/2020 07:35 AM    BUN 11 11/12/2020 07:35 AM    CREATININE 0.7 11/12/2020 07:35 AM    GFRAA >60 11/12/2020 07:35 AM    LABGLOM >60 11/12/2020 07:35 AM    GLUCOSE 102 11/12/2020 07:35 AM    PROT 7.2 11/12/2020 07:35 AM    CALCIUM 8.7 11/13/2020 08:25 AM    BILITOT 0.4 11/12/2020 07:35 AM    ALKPHOS 60 11/12/2020 07:35 AM    AST 28 11/12/2020 07:35 AM    ALT 19 11/12/2020 07:35 AM       POC Tests: No results for input(s): POCGLU, POCNA, POCK, POCCL, POCBUN, POCHEMO, POCHCT in the last 72 hours.     Coags: No results found for: PROTIME, INR, APTT    HCG (If Applicable):   Lab Results   Component Value Date    PREGTESTUR NEGATIVE 11/12/2020        ABGs: No results found for: PHART, PO2ART, UUE7OHK, QLP5BAE, BEART, Z8TSBOTS     Type & Screen (If Applicable):  No results found for: LABABO, LABRH    Drug/Infectious Status (If Applicable):  No results found for: HIV, HEPCAB    COVID-19 Screening (If Applicable):   Lab Results   Component Value Date/Time    COVID19 Not Detected 11/06/2020 01:30 PM           Anesthesia Evaluation  Patient summary reviewed no history of anesthetic complications:   Airway: Mallampati: III  TM distance: >3 FB     Mouth opening: > = 3 FB   Dental: normal exam         Pulmonary: breath sounds clear to auscultation  (+) current smoker          Patient smoked on day of surgery. Cardiovascular:Negative CV ROS            Rhythm: regular  Rate: normal                    Neuro/Psych:   Negative Neuro/Psych ROS              GI/Hepatic/Renal: Neg GI/Hepatic/Renal ROS            Endo/Other:    (+) malignancy/cancer (thyroid cancer). Abdominal:             Vascular: negative vascular ROS. Other Findings:           Anesthesia Plan      general     ASA 2             Anesthetic plan and risks discussed with patient. Use of blood products discussed with patient whom consented to blood products. Plan discussed with CRNA and attending.                     Merlin Drafts, RN   7/21/2022

## 2022-07-21 NOTE — PROGRESS NOTES
. 39.0. Presents to unit for scheduled IOL. Denies  LOF/VB, states occasional contractions. States +FM. No complications reported with this pregnancy. Call light in reach.

## 2022-07-21 NOTE — PROGRESS NOTES
Spoke with Dr. Yuki Camp via telephone; notified  /-2. 1 Contraction noted on monitor. GBS -. Orders for Cytotec q 4h, stadol for pain.  Patient able to receive epidural upon request.

## 2022-07-22 PROCEDURE — C1765 ADHESION BARRIER: HCPCS | Performed by: OBSTETRICS & GYNECOLOGY

## 2022-07-22 PROCEDURE — 3700000001 HC ADD 15 MINUTES (ANESTHESIA): Performed by: OBSTETRICS & GYNECOLOGY

## 2022-07-22 PROCEDURE — 3609079900 HC CESAREAN SECTION: Performed by: OBSTETRICS & GYNECOLOGY

## 2022-07-22 PROCEDURE — 2500000003 HC RX 250 WO HCPCS: Performed by: OBSTETRICS & GYNECOLOGY

## 2022-07-22 PROCEDURE — 2720000010 HC SURG SUPPLY STERILE: Performed by: OBSTETRICS & GYNECOLOGY

## 2022-07-22 PROCEDURE — 2580000003 HC RX 258

## 2022-07-22 PROCEDURE — 6370000000 HC RX 637 (ALT 250 FOR IP): Performed by: OBSTETRICS & GYNECOLOGY

## 2022-07-22 PROCEDURE — 7100000000 HC PACU RECOVERY - FIRST 15 MIN: Performed by: OBSTETRICS & GYNECOLOGY

## 2022-07-22 PROCEDURE — 2580000003 HC RX 258: Performed by: OBSTETRICS & GYNECOLOGY

## 2022-07-22 PROCEDURE — 1220000000 HC SEMI PRIVATE OB R&B

## 2022-07-22 PROCEDURE — 6360000002 HC RX W HCPCS: Performed by: NURSE ANESTHETIST, CERTIFIED REGISTERED

## 2022-07-22 PROCEDURE — 6360000002 HC RX W HCPCS: Performed by: OBSTETRICS & GYNECOLOGY

## 2022-07-22 PROCEDURE — 2580000003 HC RX 258: Performed by: NURSE ANESTHETIST, CERTIFIED REGISTERED

## 2022-07-22 PROCEDURE — 2709999900 HC NON-CHARGEABLE SUPPLY: Performed by: OBSTETRICS & GYNECOLOGY

## 2022-07-22 PROCEDURE — 6360000002 HC RX W HCPCS

## 2022-07-22 PROCEDURE — 7100000001 HC PACU RECOVERY - ADDTL 15 MIN: Performed by: OBSTETRICS & GYNECOLOGY

## 2022-07-22 PROCEDURE — 3700000000 HC ANESTHESIA ATTENDED CARE: Performed by: OBSTETRICS & GYNECOLOGY

## 2022-07-22 RX ORDER — SODIUM CHLORIDE, SODIUM LACTATE, POTASSIUM CHLORIDE, CALCIUM CHLORIDE 600; 310; 30; 20 MG/100ML; MG/100ML; MG/100ML; MG/100ML
INJECTION, SOLUTION INTRAVENOUS CONTINUOUS
Status: CANCELLED | OUTPATIENT
Start: 2022-07-22

## 2022-07-22 RX ORDER — ONDANSETRON 2 MG/ML
4 INJECTION INTRAMUSCULAR; INTRAVENOUS
Status: DISCONTINUED | OUTPATIENT
Start: 2022-07-22 | End: 2022-07-22 | Stop reason: HOSPADM

## 2022-07-22 RX ORDER — SODIUM CHLORIDE 0.9 % (FLUSH) 0.9 %
5-40 SYRINGE (ML) INJECTION EVERY 12 HOURS SCHEDULED
Status: DISCONTINUED | OUTPATIENT
Start: 2022-07-22 | End: 2022-07-22 | Stop reason: HOSPADM

## 2022-07-22 RX ORDER — SODIUM CHLORIDE, SODIUM LACTATE, POTASSIUM CHLORIDE, CALCIUM CHLORIDE 600; 310; 30; 20 MG/100ML; MG/100ML; MG/100ML; MG/100ML
INJECTION, SOLUTION INTRAVENOUS CONTINUOUS
Status: DISCONTINUED | OUTPATIENT
Start: 2022-07-22 | End: 2022-07-24 | Stop reason: HOSPADM

## 2022-07-22 RX ORDER — MODIFIED LANOLIN
OINTMENT (GRAM) TOPICAL
Status: DISCONTINUED | OUTPATIENT
Start: 2022-07-22 | End: 2022-07-24 | Stop reason: HOSPADM

## 2022-07-22 RX ORDER — MISOPROSTOL 200 UG/1
800 TABLET ORAL PRN
Status: DISCONTINUED | OUTPATIENT
Start: 2022-07-22 | End: 2022-07-24 | Stop reason: HOSPADM

## 2022-07-22 RX ORDER — MORPHINE SULFATE 1 MG/30ML
INJECTION INTRAVENOUS CONTINUOUS
Status: DISCONTINUED | OUTPATIENT
Start: 2022-07-22 | End: 2022-07-22 | Stop reason: SDUPTHER

## 2022-07-22 RX ORDER — DOCUSATE SODIUM 100 MG/1
100 CAPSULE, LIQUID FILLED ORAL 2 TIMES DAILY
Status: DISCONTINUED | OUTPATIENT
Start: 2022-07-22 | End: 2022-07-24 | Stop reason: HOSPADM

## 2022-07-22 RX ORDER — SODIUM CHLORIDE 0.9 % (FLUSH) 0.9 %
5-40 SYRINGE (ML) INJECTION EVERY 12 HOURS SCHEDULED
Status: DISCONTINUED | OUTPATIENT
Start: 2022-07-22 | End: 2022-07-24 | Stop reason: HOSPADM

## 2022-07-22 RX ORDER — SODIUM CHLORIDE, SODIUM LACTATE, POTASSIUM CHLORIDE, AND CALCIUM CHLORIDE .6; .31; .03; .02 G/100ML; G/100ML; G/100ML; G/100ML
1000 INJECTION, SOLUTION INTRAVENOUS ONCE
Status: CANCELLED | OUTPATIENT
Start: 2022-07-22 | End: 2022-07-22

## 2022-07-22 RX ORDER — CARBOPROST TROMETHAMINE 250 UG/ML
250 INJECTION, SOLUTION INTRAMUSCULAR PRN
Status: DISCONTINUED | OUTPATIENT
Start: 2022-07-22 | End: 2022-07-24 | Stop reason: HOSPADM

## 2022-07-22 RX ORDER — PRENATAL WITH FERROUS FUM AND FOLIC ACID 3080; 920; 120; 400; 22; 1.84; 3; 20; 10; 1; 12; 200; 27; 25; 2 [IU]/1; [IU]/1; MG/1; [IU]/1; MG/1; MG/1; MG/1; MG/1; MG/1; MG/1; UG/1; MG/1; MG/1; MG/1; MG/1
1 TABLET ORAL DAILY
Status: DISCONTINUED | OUTPATIENT
Start: 2022-07-22 | End: 2022-07-24 | Stop reason: HOSPADM

## 2022-07-22 RX ORDER — METHYLERGONOVINE MALEATE 0.2 MG/ML
200 INJECTION INTRAVENOUS PRN
Status: DISCONTINUED | OUTPATIENT
Start: 2022-07-22 | End: 2022-07-24 | Stop reason: HOSPADM

## 2022-07-22 RX ORDER — NALOXONE HYDROCHLORIDE 0.4 MG/ML
INJECTION, SOLUTION INTRAMUSCULAR; INTRAVENOUS; SUBCUTANEOUS PRN
Status: DISCONTINUED | OUTPATIENT
Start: 2022-07-22 | End: 2022-07-24 | Stop reason: HOSPADM

## 2022-07-22 RX ORDER — TERBUTALINE SULFATE 1 MG/ML
INJECTION, SOLUTION SUBCUTANEOUS
Status: COMPLETED
Start: 2022-07-22 | End: 2022-07-22

## 2022-07-22 RX ORDER — ONDANSETRON 2 MG/ML
4 INJECTION INTRAMUSCULAR; INTRAVENOUS EVERY 6 HOURS PRN
Status: DISCONTINUED | OUTPATIENT
Start: 2022-07-22 | End: 2022-07-24 | Stop reason: HOSPADM

## 2022-07-22 RX ORDER — PROPOFOL 10 MG/ML
INJECTION, EMULSION INTRAVENOUS PRN
Status: DISCONTINUED | OUTPATIENT
Start: 2022-07-22 | End: 2022-07-22 | Stop reason: SDUPTHER

## 2022-07-22 RX ORDER — SODIUM CHLORIDE 9 MG/ML
INJECTION, SOLUTION INTRAVENOUS PRN
Status: CANCELLED | OUTPATIENT
Start: 2022-07-22

## 2022-07-22 RX ORDER — SODIUM CHLORIDE 0.9 % (FLUSH) 0.9 %
10 SYRINGE (ML) INJECTION EVERY 12 HOURS SCHEDULED
Status: CANCELLED | OUTPATIENT
Start: 2022-07-22

## 2022-07-22 RX ORDER — SODIUM CHLORIDE 9 MG/ML
INJECTION, SOLUTION INTRAVENOUS PRN
Status: DISCONTINUED | OUTPATIENT
Start: 2022-07-22 | End: 2022-07-24 | Stop reason: HOSPADM

## 2022-07-22 RX ORDER — FENTANYL CITRATE 50 UG/ML
INJECTION, SOLUTION INTRAMUSCULAR; INTRAVENOUS PRN
Status: DISCONTINUED | OUTPATIENT
Start: 2022-07-22 | End: 2022-07-22 | Stop reason: SDUPTHER

## 2022-07-22 RX ORDER — MEPERIDINE HYDROCHLORIDE 25 MG/ML
12.5 INJECTION INTRAMUSCULAR; INTRAVENOUS; SUBCUTANEOUS EVERY 5 MIN PRN
Status: DISCONTINUED | OUTPATIENT
Start: 2022-07-22 | End: 2022-07-22 | Stop reason: HOSPADM

## 2022-07-22 RX ORDER — CEFAZOLIN SODIUM 1 G/3ML
INJECTION, POWDER, FOR SOLUTION INTRAMUSCULAR; INTRAVENOUS PRN
Status: DISCONTINUED | OUTPATIENT
Start: 2022-07-22 | End: 2022-07-22 | Stop reason: SDUPTHER

## 2022-07-22 RX ORDER — SUCCINYLCHOLINE CHLORIDE 20 MG/ML
INJECTION INTRAMUSCULAR; INTRAVENOUS PRN
Status: DISCONTINUED | OUTPATIENT
Start: 2022-07-22 | End: 2022-07-22 | Stop reason: SDUPTHER

## 2022-07-22 RX ORDER — IBUPROFEN 800 MG/1
800 TABLET ORAL EVERY 8 HOURS PRN
Status: DISCONTINUED | OUTPATIENT
Start: 2022-07-22 | End: 2022-07-24 | Stop reason: HOSPADM

## 2022-07-22 RX ORDER — OXYCODONE HYDROCHLORIDE 5 MG/1
10 TABLET ORAL EVERY 4 HOURS PRN
Status: DISCONTINUED | OUTPATIENT
Start: 2022-07-22 | End: 2022-07-24 | Stop reason: HOSPADM

## 2022-07-22 RX ORDER — OXYCODONE HYDROCHLORIDE 5 MG/1
5 TABLET ORAL EVERY 4 HOURS PRN
Status: DISCONTINUED | OUTPATIENT
Start: 2022-07-22 | End: 2022-07-24 | Stop reason: HOSPADM

## 2022-07-22 RX ORDER — SODIUM CHLORIDE 9 MG/ML
INJECTION, SOLUTION INTRAVENOUS CONTINUOUS PRN
Status: DISCONTINUED | OUTPATIENT
Start: 2022-07-22 | End: 2022-07-22 | Stop reason: SDUPTHER

## 2022-07-22 RX ORDER — ONDANSETRON 2 MG/ML
INJECTION INTRAMUSCULAR; INTRAVENOUS PRN
Status: DISCONTINUED | OUTPATIENT
Start: 2022-07-22 | End: 2022-07-22 | Stop reason: SDUPTHER

## 2022-07-22 RX ORDER — SODIUM CHLORIDE 0.9 % (FLUSH) 0.9 %
10 SYRINGE (ML) INJECTION PRN
Status: CANCELLED | OUTPATIENT
Start: 2022-07-22

## 2022-07-22 RX ORDER — SODIUM CHLORIDE 9 MG/ML
INJECTION, SOLUTION INTRAVENOUS PRN
Status: DISCONTINUED | OUTPATIENT
Start: 2022-07-22 | End: 2022-07-22 | Stop reason: HOSPADM

## 2022-07-22 RX ORDER — CEFAZOLIN 2 G/1
INJECTION, POWDER, FOR SOLUTION INTRAMUSCULAR; INTRAVENOUS
Status: COMPLETED
Start: 2022-07-22 | End: 2022-07-22

## 2022-07-22 RX ORDER — SODIUM CHLORIDE 0.9 % (FLUSH) 0.9 %
5-40 SYRINGE (ML) INJECTION PRN
Status: DISCONTINUED | OUTPATIENT
Start: 2022-07-22 | End: 2022-07-24 | Stop reason: HOSPADM

## 2022-07-22 RX ORDER — MORPHINE SULFATE/0.9% NACL/PF 1 MG/ML
SYRINGE (ML) INJECTION CONTINUOUS
Status: DISCONTINUED | OUTPATIENT
Start: 2022-07-22 | End: 2022-07-22

## 2022-07-22 RX ORDER — SODIUM CHLORIDE 0.9 % (FLUSH) 0.9 %
5-40 SYRINGE (ML) INJECTION PRN
Status: DISCONTINUED | OUTPATIENT
Start: 2022-07-22 | End: 2022-07-22 | Stop reason: HOSPADM

## 2022-07-22 RX ORDER — MISOPROSTOL 200 UG/1
200 TABLET ORAL PRN
Status: DISCONTINUED | OUTPATIENT
Start: 2022-07-22 | End: 2022-07-24 | Stop reason: HOSPADM

## 2022-07-22 RX ORDER — FERROUS SULFATE 325(65) MG
325 TABLET ORAL 2 TIMES DAILY WITH MEALS
Status: DISCONTINUED | OUTPATIENT
Start: 2022-07-22 | End: 2022-07-24 | Stop reason: HOSPADM

## 2022-07-22 RX ORDER — AZITHROMYCIN 250 MG/1
TABLET, FILM COATED ORAL
Status: DISCONTINUED
Start: 2022-07-22 | End: 2022-07-22 | Stop reason: WASHOUT

## 2022-07-22 RX ORDER — MORPHINE SULFATE 1 MG/ML
INJECTION, SOLUTION EPIDURAL; INTRATHECAL; INTRAVENOUS PRN
Status: DISCONTINUED | OUTPATIENT
Start: 2022-07-22 | End: 2022-07-22 | Stop reason: SDUPTHER

## 2022-07-22 RX ORDER — TRISODIUM CITRATE DIHYDRATE AND CITRIC ACID MONOHYDRATE 500; 334 MG/5ML; MG/5ML
30 SOLUTION ORAL ONCE
Status: CANCELLED | OUTPATIENT
Start: 2022-07-22 | End: 2022-07-22

## 2022-07-22 RX ORDER — MORPHINE SULFATE/0.9% NACL/PF 1 MG/ML
SYRINGE (ML) INJECTION CONTINUOUS
Status: DISCONTINUED | OUTPATIENT
Start: 2022-07-22 | End: 2022-07-24 | Stop reason: HOSPADM

## 2022-07-22 RX ADMIN — BUTORPHANOL TARTRATE 1 MG: 2 INJECTION, SOLUTION INTRAMUSCULAR; INTRAVENOUS at 00:12

## 2022-07-22 RX ADMIN — IBUPROFEN 800 MG: 800 TABLET, FILM COATED ORAL at 16:58

## 2022-07-22 RX ADMIN — TERBUTALINE SULFATE 0.25 MG: 1 INJECTION SUBCUTANEOUS at 03:25

## 2022-07-22 RX ADMIN — Medication 999 ML/HR: at 03:44

## 2022-07-22 RX ADMIN — SODIUM CHLORIDE: 9 INJECTION, SOLUTION INTRAVENOUS at 03:36

## 2022-07-22 RX ADMIN — CEFAZOLIN 2000 MG: 1 INJECTION, POWDER, FOR SOLUTION INTRAMUSCULAR; INTRAVENOUS at 03:38

## 2022-07-22 RX ADMIN — SUCCINYLCHOLINE CHLORIDE 140 MG: 20 INJECTION, SOLUTION INTRAMUSCULAR; INTRAVENOUS at 03:38

## 2022-07-22 RX ADMIN — FENTANYL CITRATE 50 MCG: 50 INJECTION, SOLUTION INTRAMUSCULAR; INTRAVENOUS at 03:45

## 2022-07-22 RX ADMIN — METHYLERGONOVINE MALEATE 200 MCG: 0.2 INJECTION, SOLUTION INTRAMUSCULAR; INTRAVENOUS at 03:49

## 2022-07-22 RX ADMIN — ONDANSETRON 4 MG: 2 INJECTION INTRAMUSCULAR; INTRAVENOUS at 03:45

## 2022-07-22 RX ADMIN — HYDROMORPHONE HYDROCHLORIDE 0.5 MG: 1 INJECTION, SOLUTION INTRAMUSCULAR; INTRAVENOUS; SUBCUTANEOUS at 05:54

## 2022-07-22 RX ADMIN — Medication 0.5 MG: at 05:54

## 2022-07-22 RX ADMIN — WATER: 1 INJECTION INTRAMUSCULAR; INTRAVENOUS; SUBCUTANEOUS at 03:57

## 2022-07-22 RX ADMIN — OXYCODONE 10 MG: 5 TABLET ORAL at 20:17

## 2022-07-22 RX ADMIN — CARBOPROST TROMETHAMINE 250 MCG: 250 INJECTION, SOLUTION INTRAMUSCULAR at 03:51

## 2022-07-22 RX ADMIN — OXYCODONE 10 MG: 5 TABLET ORAL at 13:32

## 2022-07-22 RX ADMIN — DOCUSATE SODIUM 100 MG: 100 CAPSULE, LIQUID FILLED ORAL at 20:18

## 2022-07-22 RX ADMIN — PROPOFOL 200 MG: 10 INJECTION, EMULSION INTRAVENOUS at 03:38

## 2022-07-22 RX ADMIN — FENTANYL CITRATE 50 MCG: 50 INJECTION, SOLUTION INTRAMUSCULAR; INTRAVENOUS at 03:48

## 2022-07-22 RX ADMIN — MORPHINE SULFATE 3 MG: 1 INJECTION, SOLUTION EPIDURAL; INTRATHECAL; INTRAVENOUS at 04:01

## 2022-07-22 RX ADMIN — MORPHINE SULFATE 2 MG: 1 INJECTION, SOLUTION EPIDURAL; INTRATHECAL; INTRAVENOUS at 04:18

## 2022-07-22 RX ADMIN — SODIUM CHLORIDE, PRESERVATIVE FREE 5 ML: 5 INJECTION INTRAVENOUS at 20:18

## 2022-07-22 RX ADMIN — SODIUM CHLORIDE, POTASSIUM CHLORIDE, SODIUM LACTATE AND CALCIUM CHLORIDE: 600; 310; 30; 20 INJECTION, SOLUTION INTRAVENOUS at 06:52

## 2022-07-22 RX ADMIN — TERBUTALINE SULFATE 0.25 MG: 1 INJECTION, SOLUTION SUBCUTANEOUS at 03:25

## 2022-07-22 RX ADMIN — FENTANYL CITRATE 100 MCG: 50 INJECTION, SOLUTION INTRAMUSCULAR; INTRAVENOUS at 04:00

## 2022-07-22 ASSESSMENT — PAIN DESCRIPTION - DESCRIPTORS
DESCRIPTORS: CRAMPING;ACHING;DISCOMFORT
DESCRIPTORS: ACHING

## 2022-07-22 ASSESSMENT — PAIN - FUNCTIONAL ASSESSMENT
PAIN_FUNCTIONAL_ASSESSMENT: ACTIVITIES ARE NOT PREVENTED
PAIN_FUNCTIONAL_ASSESSMENT: ACTIVITIES ARE NOT PREVENTED

## 2022-07-22 ASSESSMENT — PAIN SCALES - GENERAL
PAINLEVEL_OUTOF10: 8
PAINLEVEL_OUTOF10: 2
PAINLEVEL_OUTOF10: 5
PAINLEVEL_OUTOF10: 7

## 2022-07-22 ASSESSMENT — PAIN DESCRIPTION - LOCATION
LOCATION: INCISION
LOCATION: ABDOMEN
LOCATION: ABDOMEN;INCISION

## 2022-07-22 ASSESSMENT — PAIN DESCRIPTION - ORIENTATION
ORIENTATION: LOWER
ORIENTATION: LOWER

## 2022-07-22 NOTE — PROGRESS NOTES
Notified Dr Erma Shaffer of patient. Updated that Dr Ramses Salazar placed FSE at 1800, however FSE did not work after placement despite re-attempting replacing cords and securement device. No late decelerations noted on fhts. Fhts 120-125bpm with moderate variability. Joseph q2-3h, no new orders. Continue to monitor.

## 2022-07-22 NOTE — PROGRESS NOTES
Notified Dr Iván Mcfadden on patient. Updated that early decelerations were noted, no late decelerations noted. Stadol given at 2100, notified that minimal variability on fhts after stadol medications was administered. Notified contraction pattern q5-6min. Fhts moderate variability. Sve 4/70/-1. Orders to start pitocin at 1 lori-uni, increase by 1 hour, max out at 20.

## 2022-07-22 NOTE — PROGRESS NOTES
Medina catheter taken out  1200 ml urine output pt tolerated well   Verbalized understanding RN to go with pt first time up to bathroom

## 2022-07-22 NOTE — PROGRESS NOTES
2 RNs at bedside and pt refused morphine PCA pump   Manager notified and medication wasted with 2 RNs

## 2022-07-22 NOTE — L&D DELIVERY NOTE
Operative Note    Patient:  Darlin Ahuja     Procedure Date:  2022  Medical Record Number:  [id number]    Pre-op Dx:    1. IUP @38 weeks 6 days             2.  Fetal bradycardia              3.  Meconium stained fluid                 Post-op Dx:   1. Same              2.  Nuchal cord x2                Procedure:    1. section primary low transverse                       Surgeon:  Abran Mendoza DO      Anesthesia:  General  IV Fluids: ml IV crystalloid     Comps:  None   Urine Output:   Clear    EBL: 800       Findings: At 57 a male infant was delivered nuchal cord x2 Apgars are pending  weight 3118g    Condition:  Stable    Disposition:  Returned to recovery unit in stable condition     I was called to the room to evaluate fetal bradycardia in the 80s which did not resolve so after explaining to Jl Delacruz and her  she was taken to the operating room prepped and draped in usual fashion and general anesthesia was then administered. A pause to place to identify the patient the procedure that all instruments were present. A Pfannenstiel skin incision was made this was carried down to the fascia which was extended bluntly and anteriorly and superiorly and laterally in both directions. The rectus muscle was by the midline the peritoneum was entered bluntly and extended bluntly. Vladimir retractor was positioned the uterus was scored in a semilunar fashion and extended superiorly and inferiorly bluntly. Hand was placed in the uterine cavity with delivery of the fetal head noted 2 nuchal cords remainder of the fetus was delivered cord was cut and clamped and handed off to the NICU. A sample of cord blood was obtained to be sent to lab. The placenta was then expressed uterus was reapproximated with 0 Vicryl a second layer was used to imbricate with good hemostasis. Nico Miguelina was placed between the bladder flap and the uterus for small amount of oozing.   The Vladimir retractor was removed the omentum returned to its normal position fascia was closed with PDS subcu was reapproximated with 4 Vicryl at this time Dr. Anh Trevizo arrived and he closed the skin.   All sponge and insert counts were correct patient Toller procedure well went to cover him stable

## 2022-07-22 NOTE — PROGRESS NOTES
Dr. Joycelyn Shay called to update on fetal bradycardia. Updated FHTs 80-90 bpm for approximately 10 minutes. States he is on his way to perform c/s.

## 2022-07-22 NOTE — LACTATION NOTE
Multip-formula fed other children and wants to successfully breastfeed this baby. Assisted patient with positioning using football hold and reviewed latch techniques. Baby sleepy and disinterested in feeding-bursts of sucking only. Placed baby skin to skin with mother-(safety guidelines reviewed) and encouraged to watch for feeding cues. Mother states she had bilateral thyroidectomy-reviewed how this can affect milk supply and the signs of effective milk transfer to watch for. Instructed on normal infant behavior in the first 12-24 hrs, benefits of skin to skin and components of safe positioning, encouraged rooming-in and avoidance of pacifier use until breastfeeding is well established. Reviewed waking techniques and the importance of frequent feedings- 8-12 times/ 24 hrs to stimulate/maintain milk production. Taught hand expression and encouraged to express drops of colostrum at start of feeding. Reviewed feeding cues and expected urine/stool output and transition. Encouraged to feed infant as often and for as long as the infant wishes to do so. Reviewed BF Guide. Offered support and encouraged to call for assistance or concerns. Has electric breast pump at home.

## 2022-07-22 NOTE — PROGRESS NOTES
Pt up to bathroom and voided with relief, chicho care, new under ware and pad on   Pt sitting up in chair

## 2022-07-22 NOTE — PROGRESS NOTES
Mother instructed to breastfeed infant every 2-3 hours and on demand. Also instructed that if formula feeding to limit infant to 20 ml of formula every 3-4 hours for the first 24 hours of life. Mother verbalized understanding of all of the above. Patient admitted into room and oriented to surroundings. Introduced self and wrote this RN's name and phone extension on patient's white board. Phone and nurse's call light at patient's bedside and instructed to use for any needs. Patient instructed on new admission informational packet at bedside with information on infant testing to be done when infant is 24 hours old including [address] labs, 24 hours blood sugar, and CCHD. Patient instructed on mom baby unit policies and procedures including need to keep infant in bassinet for transport in hallways and for infant to sleep alone, on back, in an empty bassinet. Patient also instructed patient on unit visitation policy and that one same support person 25years old or older may stay overnight if desired. Patient verbalized understanding of all of the above.   Pt unsure if she received TDAP during this pregnancy

## 2022-07-23 LAB
HCT VFR BLD CALC: 28.1 % (ref 34–48)
HEMOGLOBIN: 9.3 G/DL (ref 11.5–15.5)
MCH RBC QN AUTO: 29.8 PG (ref 26–35)
MCHC RBC AUTO-ENTMCNC: 33.1 % (ref 32–34.5)
MCV RBC AUTO: 90.1 FL (ref 80–99.9)
PDW BLD-RTO: 14.6 FL (ref 11.5–15)
PLATELET # BLD: 355 E9/L (ref 130–450)
PMV BLD AUTO: 9.8 FL (ref 7–12)
RBC # BLD: 3.12 E12/L (ref 3.5–5.5)
WBC # BLD: 20.5 E9/L (ref 4.5–11.5)

## 2022-07-23 PROCEDURE — 6370000000 HC RX 637 (ALT 250 FOR IP): Performed by: OBSTETRICS & GYNECOLOGY

## 2022-07-23 PROCEDURE — 36415 COLL VENOUS BLD VENIPUNCTURE: CPT

## 2022-07-23 PROCEDURE — 1220000000 HC SEMI PRIVATE OB R&B

## 2022-07-23 PROCEDURE — 85027 COMPLETE CBC AUTOMATED: CPT

## 2022-07-23 RX ORDER — BISACODYL 10 MG
10 SUPPOSITORY, RECTAL RECTAL DAILY PRN
Status: DISCONTINUED | OUTPATIENT
Start: 2022-07-23 | End: 2022-07-24 | Stop reason: HOSPADM

## 2022-07-23 RX ORDER — SIMETHICONE 80 MG
80 TABLET,CHEWABLE ORAL EVERY 6 HOURS PRN
Status: DISCONTINUED | OUTPATIENT
Start: 2022-07-23 | End: 2022-07-24 | Stop reason: HOSPADM

## 2022-07-23 RX ADMIN — BISACODYL 10 MG: 10 SUPPOSITORY RECTAL at 18:04

## 2022-07-23 RX ADMIN — OXYCODONE 10 MG: 5 TABLET ORAL at 21:55

## 2022-07-23 RX ADMIN — METFORMIN HYDROCHLORIDE 1 TABLET: 500 TABLET, EXTENDED RELEASE ORAL at 10:20

## 2022-07-23 RX ADMIN — OXYCODONE 10 MG: 5 TABLET ORAL at 17:52

## 2022-07-23 RX ADMIN — OXYCODONE 10 MG: 5 TABLET ORAL at 11:39

## 2022-07-23 RX ADMIN — OXYCODONE 10 MG: 5 TABLET ORAL at 00:31

## 2022-07-23 RX ADMIN — DOCUSATE SODIUM 100 MG: 100 CAPSULE, LIQUID FILLED ORAL at 21:39

## 2022-07-23 RX ADMIN — FERROUS SULFATE TAB 325 MG (65 MG ELEMENTAL FE) 325 MG: 325 (65 FE) TAB at 17:52

## 2022-07-23 RX ADMIN — IBUPROFEN 800 MG: 800 TABLET, FILM COATED ORAL at 00:31

## 2022-07-23 RX ADMIN — OXYCODONE 10 MG: 5 TABLET ORAL at 06:37

## 2022-07-23 RX ADMIN — SIMETHICONE 80 MG: 80 TABLET, CHEWABLE ORAL at 18:03

## 2022-07-23 RX ADMIN — IBUPROFEN 800 MG: 800 TABLET, FILM COATED ORAL at 10:21

## 2022-07-23 RX ADMIN — FERROUS SULFATE TAB 325 MG (65 MG ELEMENTAL FE) 325 MG: 325 (65 FE) TAB at 10:21

## 2022-07-23 RX ADMIN — DOCUSATE SODIUM 100 MG: 100 CAPSULE, LIQUID FILLED ORAL at 10:21

## 2022-07-23 ASSESSMENT — PAIN DESCRIPTION - PAIN TYPE: TYPE: ACUTE PAIN

## 2022-07-23 ASSESSMENT — PAIN SCALES - GENERAL
PAINLEVEL_OUTOF10: 9
PAINLEVEL_OUTOF10: 7
PAINLEVEL_OUTOF10: 8
PAINLEVEL_OUTOF10: 8
PAINLEVEL_OUTOF10: 10
PAINLEVEL_OUTOF10: 8
PAINLEVEL_OUTOF10: 7

## 2022-07-23 ASSESSMENT — PAIN - FUNCTIONAL ASSESSMENT: PAIN_FUNCTIONAL_ASSESSMENT: ACTIVITIES ARE NOT PREVENTED

## 2022-07-23 ASSESSMENT — PAIN DESCRIPTION - LOCATION
LOCATION: ABDOMEN;INCISION
LOCATION: SHOULDER

## 2022-07-23 ASSESSMENT — PAIN DESCRIPTION - ORIENTATION: ORIENTATION: LOWER

## 2022-07-23 ASSESSMENT — PAIN DESCRIPTION - DESCRIPTORS: DESCRIPTORS: ACHING;DISCOMFORT;SORE

## 2022-07-23 NOTE — LACTATION NOTE
Pt has been formula feeding, states she wishes to breastfeed once home and not use formula. Supply and demand of milk production discussed. This was new information to this mom. EBP set up offered, but mother wishes to discharge today. Hand pump with instructions for use and cleaning given. Should mother stay another night she is encouraged to call me for EBP set up. Breastfeeding assistance also offered. Contact number on whiteboard.

## 2022-07-23 NOTE — PROGRESS NOTES
OB/GYN SERVICE  Attending Post-Op Progress Note      SUBJECTIVE: Patient without complaints    OBJECTIVE      Physical    INTAKE/OUTPUT:    Intake/Output Summary (Last 24 hours) at 2022 1559  Last data filed at 2022 2017  Gross per 24 hour   Intake 5 ml   Output --   Net 5 ml     TEMPERATURE:  Current - Temp: 97.9 °F (36.6 °C);  Max - Temp  Av °F (36.7 °C)  Min: 97.7 °F (36.5 °C)  Max: 98.3 °F (36.8 °C)  RESPIRATIONS RANGE: Resp  Av  Min: 16  Max: 16  PULSE RANGE: Pulse  Av.5  Min: 79  Max: 75  BLOOD PRESSURE RANGE:  Systolic (46YCL), XUB:100 , Min:108 , SRW:613  ; Diastolic (29TSY), CVN:01, Min:56, Max:76   CONSTITUTIONAL:  awake, alert, cooperative, no apparent distress, and appears stated age  LUNGS:  No increased work of breathing, good air exchange, clear to auscultation bilaterally, no crackles or wheezing  ABDOMEN: Incision is dry and clear without drainage and hypoactive bowel sounds  GENITAL/URINARY:  External Genitalia:  General appearance; normal, mild lochia rubra is present  Extremities: No calf tenderness +1 pedal edema  Data  CBC:   Lab Results   Component Value Date/Time    WBC 20.5 2022 05:35 AM    RBC 3.12 2022 05:35 AM    HGB 9.3 2022 05:35 AM    HCT 28.1 2022 05:35 AM    MCV 90.1 2022 05:35 AM    MCH 29.8 2022 05:35 AM    MCHC 33.1 2022 05:35 AM    RDW 14.6 2022 05:35 AM     2022 05:35 AM    MPV 9.8 2022 05:35 AM       Current Inpatient Medications    Current Facility-Administered Medications: lactated ringers infusion, , IntraVENous, Continuous  sodium chloride flush 0.9 % injection 5-40 mL, 5-40 mL, IntraVENous, 2 times per day  sodium chloride flush 0.9 % injection 5-40 mL, 5-40 mL, IntraVENous, PRN  0.9 % sodium chloride infusion, , IntraVENous, PRN  rho(D) immune globulin (HYPERRHO S/D) injection 300 mcg, 300 mcg, IntraMUSCular, Once  docusate sodium (COLACE) capsule 100 mg, 100 mg, Oral, BID  lansinoh lanolin ointment, , Topical, Q1H PRN  tetanus-diphth-acell pertussis (BOOSTRIX) injection 0.5 mL, 0.5 mL, IntraMUSCular, Prior to discharge  naloxone 0.4 mg in 10 mL sodium chloride syringe, , IntraVENous, PRN  ibuprofen (ADVIL;MOTRIN) tablet 800 mg, 800 mg, Oral, Q8H PRN  oxyCODONE (ROXICODONE) immediate release tablet 5 mg, 5 mg, Oral, Q4H PRN **OR** oxyCODONE (ROXICODONE) immediate release tablet 10 mg, 10 mg, Oral, Q4H PRN  ondansetron (ZOFRAN) injection 4 mg, 4 mg, IntraVENous, Q6H PRN  prenatal vitamin 27-1 MG tablet 1 tablet, 1 tablet, Oral, Daily  carboprost (HEMABATE) injection 250 mcg, 250 mcg, IntraMUSCular, PRN  methylergonovine (METHERGINE) injection 200 mcg, 200 mcg, IntraMUSCular, PRN  miSOPROStol (CYTOTEC) tablet 800 mcg, 800 mcg, Rectal, PRN  miSOPROStol (CYTOTEC) tablet 200 mcg, 200 mcg, Buccal, PRN  ferrous sulfate (IRON 325) tablet 325 mg, 325 mg, Oral, BID WC  morphine (DURAMORPH) 1 mg/mL PCA, , IntraVENous, Continuous  lactated ringers infusion, , IntraVENous, Continuous  lactated ringers bolus, 500 mL, IntraVENous, PRN **OR** lactated ringers bolus, 1,000 mL, IntraVENous, PRN  sodium chloride flush 0.9 % injection 5-40 mL, 5-40 mL, IntraVENous, 2 times per day  sodium chloride flush 0.9 % injection 5-40 mL, 5-40 mL, IntraVENous, PRN  0.9 % sodium chloride infusion, 25 mL, IntraVENous, PRN  methylergonovine (METHERGINE) injection 200 mcg, 200 mcg, IntraMUSCular, PRN  carboprost (HEMABATE) injection 250 mcg, 250 mcg, IntraMUSCular, PRN  miSOPROStol (CYTOTEC) tablet 800 mcg, 800 mcg, Rectal, PRN  ondansetron (ZOFRAN) injection 4 mg, 4 mg, IntraVENous, Q6H PRN  oxytocin (PITOCIN) 30 units in 500 mL infusion, 1-20 lori-units/min, IntraVENous, Continuous    ASSESSMENT AND PLAN    40 y.o. female status post  post op day #  1 stable  We will encourage ambulation and deep breathing    Julio Mayorga MD  3:58 PM

## 2022-07-23 NOTE — DISCHARGE INSTRUCTIONS
your baby's gums to gently break the latch. If you don't break the latch before you remove your baby, your nipples can become sore, cracked, or bruised. Where can you learn more? Go to https://chpeahmet.Dragonfly [email or web address] org and sign in to your [employer] account. Enter X024 in the [facility] box to learn more about \"How to Breastfeed: Step by Step. \"     If you do not have an account, please click on the \"Sign Up Now\" link. Current as of: June 16, 2021               Content Version: 13.0  © 4469-4143 [employer]. Care instructions adapted under license by [facility] [place] ([facility]). If you have questions about a medical condition or this instruction, always ask your healthcare professional. [address] any warranty or liability for your use of this information. Learning About Using a Breast Pump  What is a breast pump? A breast pump is a device that allows you to empty milk from your breasts whenever you want to or need to. Then you can store the milk for later. Why use a breast pump? Using a breast pump is a good way to provide the benefits of breastfeeding when you have to be away from your baby. Pumping will help keep up your milk supply. It also prevents the discomfort of your breasts getting too full of milk. You can also use a breast pump to slowly reduce your milk supply if you have to stop breastfeeding. What types of breast pumps can you use? There are different types of breast pumps to choose from. Manual pumps. These pumps are operated by hand. They work well to relieve engorgement (when your breasts are too full of milk). They are easy to carry with you, don't require a power supply, and may be less expensive than other types of pumps. But they are slower than electric or battery-operated pumps and can't be used hands-free. Electric pumps. These are designed to be used a lot. Most are faster and more comfortable than manual pumps.  Some types closely imitate the action of a breastfeeding infant. They can help you maintain your milk production if you bottle-feed breast milk often. Electric pumps tend to be larger and heavier than manual pumps. But they are also the fastest way to pump milk. Some of the newer models are very lightweight. Battery-operated pumps. These are convenient when you don't have an electrical outlet handy. Most battery-operated pumps use a rechargeable battery. Most breast pumps are easy to use. But if you have any problems with pumping, ask for help. A lactation consultant or other breastfeeding expert can help you learn how to use a breast pump. How do you use a breast pump? Pumping milk with a breast pump will probably take 10 to 20 minutes for each breast, but it may take longer. To keep your milk supply up, try to pump at least every 3 to 4 hours, and breastfeed as often as you can. Get to know your pump. Read all the instructions that came with your pump. Be sure you know how to put it together and how often you'll need to clean and sanitize the parts. It's a good idea to have supplies and spare parts in all the places where you'll use the pump, such as at home and at work. Choose a good place to pump. Find a spot that's clean, comfortable, and private so you can relax. If you're pumping at work, you may feel more at ease in a room that has a door you can lock. Have water, food, and something to read with you, if you wish. Wash your hands before you touch the breast shield or your breast.   Use soap, and scrub your hands for 10 to 15 seconds. Then rinse well in warm water. Use a clean paper towel to dry your hands. Put the pump together. As you put it together, check to see that all parts are clean. Get in the mood. Holding your baby may improve your milk letdown. If you aren't with your baby, try looking at a photo or sniffing a piece of clothing your baby has worn.   Position the breast shield over your breast.   Your nipple should be right in the middle of the shield. You may need to try a few different sizes of breast shield to find one that fits you best. Some women use a special bra that holds the shield in place. This lets you have your hands free. Start pumping. Begin with a low level of suction. Increase suction as your milk starts to flow. Some pumps will do this for you. To know when to stop pumping, watch for signs that your breasts are empty. You will feel a tugging while pumping, but it shouldn't be painful. If it hurts, stop pumping. Change the position of the breast shield, or try a different size of breast shield. Empty both breasts during each pumping session. After you pump, your breasts should feel soft with no hard areas. After you finish pumping:  Put the milk in a refrigerator or cooler right away. It's best to use milk as soon as you can after you pump it. If you won't be using the milk within a few days, you can freeze it. Be sure you know how to store breast milk safely. Take the pump apart, and wash well any part that came in contact with your breast or breast milk. Let the parts air-dry. What should you do if you have trouble pumping? If you have problems using a breast pump, or if your milk supply is getting smaller, talk to a lactation consultant. Follow-up care is a key part of your treatment and safety. Be sure to make and go to all appointments, and call your doctor if you are having problems. It's also a good idea to know your test results and keep a list of the medicines you take. Where can you learn more? Go to https://[employer].shirin.LiveAir [email or web address] org and sign in to your [employer] account. Enter F690 in the [employer] box to learn more about \"Learning About Using a Breast Pump. \"     If you do not have an account, please click on the \"Sign Up Now\" link. Current as of: October 8, 2020               Content Version: 12.9  © 1014-4100 Healthwise, [employer]. Care instructions adapted under license by [facility] ([facility]). If you have questions about a medical condition or this instruction, always ask your healthcare professional. [address] any warranty or liability for your use of this information. Follow-up with your OB doctor in 1 week if  delivery or in  6 weeks for vaginal delivery unless otherwise instructed. Call office for an appointment. For breastfeeding support, you can contact our lactation specialists at [phone] or [phone]    DIET  Eat a well balanced diet focusing on foods high in fiber and protein  Drink plenty of fluids especially water. To avoid constipation you may take a mild stool softener as recommended by your doctor or midwife. ACTIVITY  Gradually increase your activity. Resume exercise regimen only after advised by your doctor or midwife. Avoid lifting anything heavier than your baby or a gallon of milk for SIX weeks. Avoid driving until your doctor or midwife has given their approval.  Neeta Ego slowly from a lying to sitting and then a standing position. Climb stairs one at a time. Use caution when carrying your baby up and down the stairs. No sexual activity for 6 weeks or until advised by your doctor - Nothing in vagina: intercourse, tampons, or douching. Be prepared to discuss family planning at your follow-up OB visit. You may feel tired or have a lack of energy. You may continue your prenatal vitamin to replenish nutrients post delivery. Nap when baby naps to catch up on sleep. May return to work or school in 6 weeks or as directed by OB. EMOTIONS  You may feed lake, sad, teary, & overwhelmed. Contact your OB provider if you feel you may be showing signs of postpartum depression, or have thoughts of harming yourself or your infant. If infant will not stop crying, contact another adult for help or place infant in their crib on their back and take a break.   NEVER shake your infant. BLEEDING  Vaginal bleeding will decrease in amount over the next few weeks. You will notice that as your activity increases, your flow may increase. This is your body's way of telling you, you need to take things easier and rest more often. Call your OB/ER if you are saturating more than one maxi pad in an hour. BREAST CARE  Take medications as recommended by your doctor or midwife for pain  If you develop a warm, red, tender area on your breast or develop a fever contact your OB provider. For breastfeeding moms:  If you become engorged, feeding may be more difficult or painful for 1-2 days. You may find it helpful to hand express some milk so that the infant can latch on more easily. While breastfeeding, continue to take your prenatal vitamins as directed by your doctor or midwife. Only take medications verified as safe for breastfeeding. For non-breastfeeding moms:  You may apply ice packs to your breasts over your bra for twenty minutes at a time for comfort. Avoid stimulation to your breasts, when showering allow the water to strike your back not your breasts. Wear a good fitting bra until your milk dries, such as a sports bra. INCISIONAL CARE / SARAH CARE  Clean your incision in the shower with mild soap. After shower pat the incision area dry and leave open to air. If used, Steri-stipes should be removed by 2 weeks. If used, Verla Solway should be removed by the OB in office by 1 week. If used/ordered, an abdominal binder may provide support for your incision. Use the sarah-bottle after toileting until bleeding stops. Cleanse your perineum from front to back  If used, stitches or internal clips will dissolve in 4-6 weeks. You may use a sitz bath or soak in a clean tub as needed for comfort. Kegel exercises will help restore bladder control. SWELLING  Keep your legs elevated when sitting or lying.    When wearing stocking or socks, make sure they are not too tight.    WHEN TO CALL THE DOCTOR  If you have a temp of 100.4 or more. If your bleeding has increased and you are saturating a pad in an hour. Your abdomen is tender to touch. You are passing blood clots bigger than the size of a lemon. If you are experiencing extreme weakness or dizziness. If you are having flu-like symptoms such as achy muscles or joints. There is a foul smell or a green color to your vaginal bleeding. If you have pain that cannot be relieved. You have persistent burning with urination or frequency. Call if you have concerns about your well-being. You are unable to sleep, eat, or are having thoughts of harming yourself or your baby. You have swelling, bleeding, drainage, foul odor, redness, or warmth in/around your incision or stitches. You have a red, warm, tender area in you calf. Follow-up with your OB doctor in 1 week if  delivery or in  6 weeks for vaginal delivery unless otherwise instructed. Call office for an appointment. For breastfeeding support, you can contact our lactation specialists at [phone] or [phone]    DIET  Eat a well balanced diet focusing on foods high in fiber and protein  Drink plenty of fluids especially water. To avoid constipation you may take a mild stool softener as recommended by your doctor or midwife. ACTIVITY  Gradually increase your activity. Resume exercise regimen only after advised by your doctor or midwife. Avoid lifting anything heavier than your baby or a gallon of milk for SIX weeks. Avoid driving until your doctor or midwife has given their approval.  [place] slowly from a lying to sitting and then a standing position. Climb stairs one at a time. Use caution when carrying your baby up and down the stairs. No sexual activity for 6 weeks or until advised by your doctor - Nothing in vagina: intercourse, tampons, or douching. Be prepared to discuss family planning at your follow-up OB visit.    You may feel tired or have a lack of energy. You may continue your prenatal vitamin to replenish nutrients post delivery. Nap when baby naps to catch up on sleep. May return to work or school in 6 weeks or as directed by OB. EMOTIONS  You may feed lake, sad, teary, & overwhelmed. Contact your OB provider if you feel you may be showing signs of postpartum depression, or have thoughts of harming yourself or your infant. If infant will not stop crying, contact another adult for help or place infant in their crib on their back and take a break. NEVER shake your infant. BLEEDING  Vaginal bleeding will decrease in amount over the next few weeks. You will notice that as your activity increases, your flow may increase. This is your body's way of telling you, you need to take things easier and rest more often. Call your OB/ER if you are saturating more than one maxi pad in an hour. BREAST CARE  Take medications as recommended by your doctor or midwife for pain  If you develop a warm, red, tender area on your breast or develop a fever contact your OB provider. For breastfeeding moms:  If you become engorged, feeding may be more difficult or painful for 1-2 days. You may find it helpful to hand express some milk so that the infant can latch on more easily. While breastfeeding, continue to take your prenatal vitamins as directed by your doctor or midwife. Only take medications verified as safe for breastfeeding. For non-breastfeeding moms:  You may apply ice packs to your breasts over your bra for twenty minutes at a time for comfort. Avoid stimulation to your breasts, when showering allow the water to strike your back not your breasts. Wear a good fitting bra until your milk dries, such as a sports bra. INCISIONAL CARE / SARAH CARE  Clean your incision in the shower with mild soap. After shower pat the incision area dry and leave open to air. If used, Steri-stipes should be removed by 2 weeks.   If used, Narvis Sharp should be removed by the OB in office by 1 week. If used/ordered, an abdominal binder may provide support for your incision. Use the chicho-bottle after toileting until bleeding stops. Cleanse your perineum from front to back  If used, stitches or internal clips will dissolve in 4-6 weeks. You may use a sitz bath or soak in a clean tub as needed for comfort. Kegel exercises will help restore bladder control. SWELLING  Keep your legs elevated when sitting or lying. When wearing stocking or socks, make sure they are not too tight. WHEN TO CALL THE DOCTOR  If you have a temp of 100.4 or more. If your bleeding has increased and you are saturating a pad in an hour. Your abdomen is tender to touch. You are passing blood clots bigger than the size of a lemon. If you are experiencing extreme weakness or dizziness. If you are having flu-like symptoms such as achy muscles or joints. There is a foul smell or a green color to your vaginal bleeding. If you have pain that cannot be relieved. You have persistent burning with urination or frequency. Call if you have concerns about your well-being. You are unable to sleep, eat, or are having thoughts of harming yourself or your baby. You have swelling, bleeding, drainage, foul odor, redness, or warmth in/around your incision or stitches. You have a red, warm, tender area in you calf.

## 2022-07-23 NOTE — ANESTHESIA POSTPROCEDURE EVALUATION
Department of Anesthesiology  Postprocedure Note    Patient: Elida Melgoza  MRN: [id number]  YOB: 1985  Date of evaluation: 2022      Procedure Summary     Date: 22 Room / Location: [facility] OR 02 / [facility]    Anesthesia Start:  Anesthesia Stop: 3662    Procedures:        SECTION (Uterus)      Labor Analgesia Diagnosis:       Fetal intolerance to labor, delivered, current hospitalization      (Fetal intolerance to labor, delivered, current hospitalization [O77.9])    Surgeons: Hien Sanchez MD Responsible Provider: Esteban Pedraza DO    Anesthesia Type: general, spinal, epidural ASA Status: 2          Anesthesia Type: No value filed.     Merly Phase I: Merly Score: 9    Merly Phase II:        Anesthesia Post Evaluation    Patient location during evaluation: bedside  Patient participation: complete - patient participated  Level of consciousness: awake and alert  Airway patency: patent  Nausea & Vomiting: no nausea and no vomiting  Complications: no  Cardiovascular status: hemodynamically stable  Respiratory status: acceptable

## 2022-07-24 VITALS
RESPIRATION RATE: 16 BRPM | TEMPERATURE: 97.9 F | HEART RATE: 76 BPM | SYSTOLIC BLOOD PRESSURE: 135 MMHG | OXYGEN SATURATION: 99 % | DIASTOLIC BLOOD PRESSURE: 68 MMHG

## 2022-07-24 PROCEDURE — 59514 CESAREAN DELIVERY ONLY: CPT | Performed by: OBSTETRICS & GYNECOLOGY

## 2022-07-24 PROCEDURE — 6370000000 HC RX 637 (ALT 250 FOR IP): Performed by: OBSTETRICS & GYNECOLOGY

## 2022-07-24 RX ORDER — PSEUDOEPHEDRINE HCL 30 MG
100 TABLET ORAL 2 TIMES DAILY
Qty: 60 CAPSULE | Refills: 3 | Status: SHIPPED | OUTPATIENT
Start: 2022-07-24

## 2022-07-24 RX ORDER — FERROUS SULFATE 325(65) MG
325 TABLET ORAL 2 TIMES DAILY WITH MEALS
Qty: 60 TABLET | Refills: 3 | Status: SHIPPED | OUTPATIENT
Start: 2022-07-24

## 2022-07-24 RX ORDER — SIMETHICONE 80 MG
80 TABLET,CHEWABLE ORAL EVERY 6 HOURS PRN
Qty: 90 TABLET | Refills: 3 | Status: SHIPPED | OUTPATIENT
Start: 2022-07-24

## 2022-07-24 RX ORDER — MODIFIED LANOLIN
1 OINTMENT (GRAM) TOPICAL
Qty: 1 EACH | Refills: 3 | Status: SHIPPED | OUTPATIENT
Start: 2022-07-24

## 2022-07-24 RX ORDER — IBUPROFEN 800 MG/1
800 TABLET ORAL EVERY 8 HOURS PRN
Qty: 60 TABLET | Refills: 1 | Status: SHIPPED | OUTPATIENT
Start: 2022-07-24

## 2022-07-24 RX ORDER — OXYCODONE HYDROCHLORIDE 5 MG/1
5 TABLET ORAL EVERY 6 HOURS PRN
Qty: 20 TABLET | Refills: 0 | Status: SHIPPED | OUTPATIENT
Start: 2022-07-24 | End: 2022-07-29

## 2022-07-24 RX ADMIN — OXYCODONE 10 MG: 5 TABLET ORAL at 03:06

## 2022-07-24 RX ADMIN — SIMETHICONE 80 MG: 80 TABLET, CHEWABLE ORAL at 08:13

## 2022-07-24 RX ADMIN — OXYCODONE 10 MG: 5 TABLET ORAL at 08:14

## 2022-07-24 RX ADMIN — METFORMIN HYDROCHLORIDE 1 TABLET: 500 TABLET, EXTENDED RELEASE ORAL at 08:13

## 2022-07-24 RX ADMIN — DOCUSATE SODIUM 100 MG: 100 CAPSULE, LIQUID FILLED ORAL at 08:14

## 2022-07-24 RX ADMIN — FERROUS SULFATE TAB 325 MG (65 MG ELEMENTAL FE) 325 MG: 325 (65 FE) TAB at 08:14

## 2022-07-24 RX ADMIN — IBUPROFEN 800 MG: 800 TABLET, FILM COATED ORAL at 06:44

## 2022-07-24 ASSESSMENT — PAIN SCALES - GENERAL
PAINLEVEL_OUTOF10: 7
PAINLEVEL_OUTOF10: 10
PAINLEVEL_OUTOF10: 1
PAINLEVEL_OUTOF10: 7

## 2022-07-24 ASSESSMENT — PAIN DESCRIPTION - PAIN TYPE: TYPE: ACUTE PAIN

## 2022-07-24 ASSESSMENT — PAIN DESCRIPTION - LOCATION
LOCATION: ABDOMEN;INCISION

## 2022-07-24 ASSESSMENT — PAIN - FUNCTIONAL ASSESSMENT
PAIN_FUNCTIONAL_ASSESSMENT: ACTIVITIES ARE NOT PREVENTED

## 2022-07-24 ASSESSMENT — PAIN DESCRIPTION - DESCRIPTORS
DESCRIPTORS: ACHING;DISCOMFORT;SORE
DESCRIPTORS: ACHING;DISCOMFORT;SORE
DESCRIPTORS: CRAMPING;DISCOMFORT;SORE

## 2022-07-24 ASSESSMENT — PAIN DESCRIPTION - ORIENTATION
ORIENTATION: LOWER
ORIENTATION: LOWER;MID
ORIENTATION: LOWER

## 2022-07-24 ASSESSMENT — PAIN DESCRIPTION - FREQUENCY: FREQUENCY: INTERMITTENT

## 2022-07-24 ASSESSMENT — PAIN DESCRIPTION - ONSET: ONSET: GRADUAL

## 2022-07-24 ASSESSMENT — PAIN DESCRIPTION - RADICULAR PAIN: RADICULAR_PAIN: ABSENT

## 2022-07-24 NOTE — PROGRESS NOTES
OB/GYN SERVICE  Attending Post-Op Progress Note      SUBJECTIVE: Patient without complaints    OBJECTIVE      Physical    INTAKE/OUTPUT:  No intake or output data in the 24 hours ending 22  TEMPERATURE:  Current - Temp: 97.9 °F (36.6 °C);  Max - Temp  Av °F (36.7 °C)  Min: 97.9 °F (36.6 °C)  Max: 98.2 °F (36.8 °C)  RESPIRATIONS RANGE: Resp  Av  Min: 16  Max: 16  PULSE RANGE: Pulse  Av.7  Min: 75  Max: 76  BLOOD PRESSURE RANGE:  Systolic (36TAI), FLE:931 , Min:108 , EEU:681  ; Diastolic (94YNB), BPS:28, Min:56, Max:68   CONSTITUTIONAL:  awake, alert, cooperative, no apparent distress, and appears stated age  LUNGS:  No increased work of breathing, good air exchange, clear to auscultation bilaterally, no crackles or wheezing  ABDOMEN: Incision is dry and clean without drainage and normal bowel sounds  GENITAL/URINARY:  External Genitalia:  General appearance; normal, mild lochia rubra is present  Extremities: No calf tenderness +1 pedal edema  Data  CBC:   Lab Results   Component Value Date/Time    WBC 20.5 2022 05:35 AM    RBC 3.12 2022 05:35 AM    HGB 9.3 2022 05:35 AM    HCT 28.1 2022 05:35 AM    MCV 90.1 2022 05:35 AM    MCH 29.8 2022 05:35 AM    MCHC 33.1 2022 05:35 AM    RDW 14.6 2022 05:35 AM     2022 05:35 AM    MPV 9.8 2022 05:35 AM       Current Inpatient Medications    Current Facility-Administered Medications: bisacodyl (DULCOLAX) suppository 10 mg, 10 mg, Rectal, Daily PRN  simethicone (MYLICON) chewable tablet 80 mg, 80 mg, Oral, Q6H PRN  lactated ringers infusion, , IntraVENous, Continuous  sodium chloride flush 0.9 % injection 5-40 mL, 5-40 mL, IntraVENous, 2 times per day  sodium chloride flush 0.9 % injection 5-40 mL, 5-40 mL, IntraVENous, PRN  0.9 % sodium chloride infusion, , IntraVENous, PRN  rho(D) immune globulin (HYPERRHO S/D) injection 300 mcg, 300 mcg, IntraMUSCular, Once  docusate sodium (COLACE) capsule 100 mg, 100 mg, Oral, BID  lansinoh lanolin ointment, , Topical, Q1H PRN  tetanus-diphth-acell pertussis (BOOSTRIX) injection 0.5 mL, 0.5 mL, IntraMUSCular, Prior to discharge  naloxone 0.4 mg in 10 mL sodium chloride syringe, , IntraVENous, PRN  ibuprofen (ADVIL;MOTRIN) tablet 800 mg, 800 mg, Oral, Q8H PRN  oxyCODONE (ROXICODONE) immediate release tablet 5 mg, 5 mg, Oral, Q4H PRN **OR** oxyCODONE (ROXICODONE) immediate release tablet 10 mg, 10 mg, Oral, Q4H PRN  ondansetron (ZOFRAN) injection 4 mg, 4 mg, IntraVENous, Q6H PRN  prenatal vitamin 27-1 MG tablet 1 tablet, 1 tablet, Oral, Daily  carboprost (HEMABATE) injection 250 mcg, 250 mcg, IntraMUSCular, PRN  methylergonovine (METHERGINE) injection 200 mcg, 200 mcg, IntraMUSCular, PRN  miSOPROStol (CYTOTEC) tablet 800 mcg, 800 mcg, Rectal, PRN  miSOPROStol (CYTOTEC) tablet 200 mcg, 200 mcg, Buccal, PRN  ferrous sulfate (IRON 325) tablet 325 mg, 325 mg, Oral, BID WC  morphine (DURAMORPH) 1 mg/mL PCA, , IntraVENous, Continuous  lactated ringers infusion, , IntraVENous, Continuous  lactated ringers bolus, 500 mL, IntraVENous, PRN **OR** lactated ringers bolus, 1,000 mL, IntraVENous, PRN  sodium chloride flush 0.9 % injection 5-40 mL, 5-40 mL, IntraVENous, 2 times per day  sodium chloride flush 0.9 % injection 5-40 mL, 5-40 mL, IntraVENous, PRN  0.9 % sodium chloride infusion, 25 mL, IntraVENous, PRN  methylergonovine (METHERGINE) injection 200 mcg, 200 mcg, IntraMUSCular, PRN  carboprost (HEMABATE) injection 250 mcg, 250 mcg, IntraMUSCular, PRN  miSOPROStol (CYTOTEC) tablet 800 mcg, 800 mcg, Rectal, PRN  ondansetron (ZOFRAN) injection 4 mg, 4 mg, IntraVENous, Q6H PRN  oxytocin (PITOCIN) 30 units in 500 mL infusion, 1-20 lori-units/min, IntraVENous, Continuous    ASSESSMENT AND PLAN    40 y.o. female status post  post op day #  2 doing well  Patient is for discharge to home today    Cecil Plascencia MD  9:14 AM

## 2022-07-24 NOTE — LACTATION NOTE
Mother discharging today. She wishes to pump once home. Encouraged outpatient consult in latching at breast desired once milk in. Encouraged frequent pumping every 2-3 hours to establish milk supply. Reviewed benefits and safety of skin to skin. Lactation office # given if follow-up needed, as well as other helpful resources. Encouraged to call with any concerns. Support and encouragement given. [employer] zeina promoted for download and reference once home.

## 2022-07-24 NOTE — DISCHARGE SUMMARY
Obstetrical Discharge Form    Primary OB Clinician: NEIL Washburn,  FACOG  Postpartum/postoperative day #2    Gestational Age at time of delivery: 43 weeks and 1 day    Date of Delivery: 2022; Time of Delivery: 405    Delivery Type: primary  section, low transverse incision    Baby: Liveborn male,     Intrapartum complications: Non-reassuring Fetal Status    Laceration: none    Episiotomy: none,    Feeding method: both breast and bottle - Similac with iron    Rh Immune globulin given: no    Rubella vaccine given: no    Discharge Date: 2022; Discharge Time: 923    Early Discharge:  YES-Maternal-[place] home visit declined by patient. Condition at time of discharge home is good as well as disposition    Plan:     Follow-up appointment with Dr. Miriam Fan in 2 weeks.

## 2022-07-24 NOTE — PLAN OF CARE
Problem: Postpartum  Goal: Experiences normal postpartum course  Description:  Postpartum OB-Pregnancy care plan goal which identifies if the mother is experiencing a normal postpartum course  Outcome: Progressing  Goal: Appropriate maternal -  bonding  Description:  Postpartum OB-Pregnancy care plan goal which identifies if the mother and  are bonding appropriately  Outcome: Progressing  Goal: Establishment of infant feeding pattern  Description:  Postpartum OB-Pregnancy care plan goal which identifies if the mother is establishing a feeding pattern with their   Outcome: Progressing  Goal: Incisions, wounds, or drain sites healing without S/S of infection  Outcome: Progressing

## 2022-07-24 NOTE — PROGRESS NOTES
Discharge teaching completed for patient. Patient voiced understanding of instructions. Questions answered. standing/actual

## 2022-09-08 PROBLEM — R10.9 ACUTE POSTOPERATIVE PAIN OF ABDOMEN: Status: ACTIVE | Noted: 2022-09-08

## 2022-09-08 PROBLEM — G89.18 ACUTE POSTOPERATIVE PAIN OF ABDOMEN: Status: ACTIVE | Noted: 2022-09-08

## 2023-03-03 ENCOUNTER — HOSPITAL ENCOUNTER (OUTPATIENT)
Age: 38
Discharge: HOME | End: 2023-03-05

## 2023-03-03 PROCEDURE — 88305 TISSUE EXAM BY PATHOLOGIST: CPT

## 2023-03-03 PROCEDURE — 87081 CULTURE SCREEN ONLY: CPT

## 2023-03-04 LAB — CLOTEST: NORMAL

## 2023-05-02 ENCOUNTER — HOSPITAL ENCOUNTER (EMERGENCY)
Age: 38
Discharge: HOME | End: 2023-05-02
Payer: COMMERCIAL

## 2023-05-02 ENCOUNTER — APPOINTMENT (OUTPATIENT)
Dept: CT IMAGING | Age: 38
End: 2023-05-02
Payer: COMMERCIAL

## 2023-05-02 VITALS
WEIGHT: 180 LBS | DIASTOLIC BLOOD PRESSURE: 84 MMHG | HEIGHT: 66 IN | TEMPERATURE: 97.6 F | HEART RATE: 95 BPM | OXYGEN SATURATION: 99 % | RESPIRATION RATE: 16 BRPM | SYSTOLIC BLOOD PRESSURE: 130 MMHG | BODY MASS INDEX: 28.93 KG/M2

## 2023-05-02 DIAGNOSIS — R51.9 NONINTRACTABLE HEADACHE, UNSPECIFIED CHRONICITY PATTERN, UNSPECIFIED HEADACHE TYPE: Primary | ICD-10-CM

## 2023-05-02 DIAGNOSIS — G44.209 TENSION HEADACHE: ICD-10-CM

## 2023-05-02 PROCEDURE — 72125 CT NECK SPINE W/O DYE: CPT

## 2023-05-02 PROCEDURE — 70450 CT HEAD/BRAIN W/O DYE: CPT

## 2023-05-02 PROCEDURE — 2580000003 HC RX 258: Performed by: PHYSICIAN ASSISTANT

## 2023-05-02 PROCEDURE — 6360000002 HC RX W HCPCS: Performed by: PHYSICIAN ASSISTANT

## 2023-05-02 PROCEDURE — 96374 THER/PROPH/DIAG INJ IV PUSH: CPT

## 2023-05-02 PROCEDURE — 96375 TX/PRO/DX INJ NEW DRUG ADDON: CPT

## 2023-05-02 PROCEDURE — 99284 EMERGENCY DEPT VISIT MOD MDM: CPT

## 2023-05-02 RX ORDER — METOCLOPRAMIDE HYDROCHLORIDE 5 MG/ML
10 INJECTION INTRAMUSCULAR; INTRAVENOUS ONCE
Status: COMPLETED | OUTPATIENT
Start: 2023-05-02 | End: 2023-05-02

## 2023-05-02 RX ORDER — NAPROXEN 500 MG/1
500 TABLET ORAL 2 TIMES DAILY
Qty: 60 TABLET | Refills: 0 | Status: SHIPPED | OUTPATIENT
Start: 2023-05-02 | End: 2023-05-02 | Stop reason: CLARIF

## 2023-05-02 RX ORDER — KETOROLAC TROMETHAMINE 30 MG/ML
30 INJECTION, SOLUTION INTRAMUSCULAR; INTRAVENOUS ONCE
Status: COMPLETED | OUTPATIENT
Start: 2023-05-02 | End: 2023-05-02

## 2023-05-02 RX ORDER — KETOROLAC TROMETHAMINE 10 MG/1
10 TABLET, FILM COATED ORAL EVERY 8 HOURS PRN
Qty: 20 TABLET | Refills: 0 | Status: SHIPPED | OUTPATIENT
Start: 2023-05-02

## 2023-05-02 RX ORDER — 0.9 % SODIUM CHLORIDE 0.9 %
1000 INTRAVENOUS SOLUTION INTRAVENOUS ONCE
Status: COMPLETED | OUTPATIENT
Start: 2023-05-02 | End: 2023-05-02

## 2023-05-02 RX ORDER — ORPHENADRINE CITRATE 30 MG/ML
60 INJECTION INTRAMUSCULAR; INTRAVENOUS ONCE
Status: COMPLETED | OUTPATIENT
Start: 2023-05-02 | End: 2023-05-02

## 2023-05-02 RX ORDER — ONDANSETRON 4 MG/1
4 TABLET, ORALLY DISINTEGRATING ORAL EVERY 8 HOURS PRN
Qty: 30 TABLET | Refills: 0 | Status: SHIPPED | OUTPATIENT
Start: 2023-05-02

## 2023-05-02 RX ORDER — DIPHENHYDRAMINE HYDROCHLORIDE 50 MG/ML
25 INJECTION INTRAMUSCULAR; INTRAVENOUS ONCE
Status: COMPLETED | OUTPATIENT
Start: 2023-05-02 | End: 2023-05-02

## 2023-05-02 RX ORDER — CYCLOBENZAPRINE HCL 10 MG
10 TABLET ORAL 2 TIMES DAILY PRN
Qty: 30 TABLET | Refills: 0 | Status: SHIPPED | OUTPATIENT
Start: 2023-05-02

## 2023-05-02 RX ADMIN — DIPHENHYDRAMINE HYDROCHLORIDE 25 MG: 50 INJECTION, SOLUTION INTRAMUSCULAR; INTRAVENOUS at 18:45

## 2023-05-02 RX ADMIN — SODIUM CHLORIDE 1000 ML: 9 INJECTION, SOLUTION INTRAVENOUS at 18:46

## 2023-05-02 RX ADMIN — ORPHENADRINE CITRATE 60 MG: 30 INJECTION INTRAMUSCULAR; INTRAVENOUS at 18:45

## 2023-05-02 RX ADMIN — KETOROLAC TROMETHAMINE 30 MG: 30 INJECTION, SOLUTION INTRAMUSCULAR; INTRAVENOUS at 18:45

## 2023-05-02 RX ADMIN — METOCLOPRAMIDE 10 MG: 5 INJECTION, SOLUTION INTRAMUSCULAR; INTRAVENOUS at 18:46

## 2023-05-02 ASSESSMENT — PAIN SCALES - GENERAL: PAINLEVEL_OUTOF10: 9

## 2023-05-02 ASSESSMENT — PAIN - FUNCTIONAL ASSESSMENT: PAIN_FUNCTIONAL_ASSESSMENT: 0-10

## 2023-05-02 ASSESSMENT — PAIN DESCRIPTION - LOCATION: LOCATION: HEAD

## 2023-05-02 NOTE — ED PROVIDER NOTES
[facility] Visit. Department of Emergency Medicine   ED  Provider Note  Admit Date/RoomTime: 2023  5:46 PM  ED Room:      Chief Complaint:   Headache (Right sided headache x4 days radiating into right side of neck) and Neck Pain    History of Present Illness      Lianet Painting is a 40 y.o. old female who presents to the ED for right-sided headache that has been ongoing for the past 4 days. Patient states the pain does radiate into the right side of her neck. She denies any history of migraines. She denies any trauma or injury. Patient denies numbness/tingling or sensation changes. She denies fever/chills, cough, congestion, rash, chest pain, shortness of breath, pain with breathing, abdominal pain, nausea, vomiting, or difficulty with ambulation or balance. Patient is alert and oriented x3 and in no apparent distress at this exam.  She is nontoxic-appearing. PCP: DO LAURA Nunn   Pertinent positives and negatives are stated within HPI, all other systems reviewed and are negative. Past Medical History:  has a past medical history of Thyroid disease. Past Surgical History:  has a past surgical history that includes Foot surgery (Right, ); Thyroidectomy (Bilateral, 2020); and  section (N/A, 2022). Social History:  reports that she has been smoking cigarettes. She has been smoking an average of .5 packs per day. She has never used smokeless tobacco. She reports current alcohol use. She reports that she does not use drugs. Family History: family history includes Cancer in her maternal aunt, maternal grandfather, maternal uncle, paternal aunt, paternal grandfather, and paternal uncle; Diabetes in her maternal grandmother; Heart Disease in her father. The patients home medications have been reviewed. Allergies: Patient has no known allergies. Allergies have been reviewed with patient.      Physical Exam   VS:  BP

## 2023-05-02 NOTE — ED NOTES
Reviewed discharge information. Patient verbalized understanding of paperwork, medication, and care instructions. Patient denied any questions.      Darlene Stout RN  05/02/23 [id number]

## 2023-05-02 NOTE — ED TRIAGE NOTES
FIRST PROVIDER CONTACT ASSESSMENT NOTE       Department of Emergency Medicine                 First Provider Note            23  3:58 PM EDT    Date of Encounter: No admission date for patient encounter. Patient Name: Jaycee Rodriguez  : 1985  MRN: [id number]    Chief Complaint: Headache (Right sided headache x4 days radiating into right side of neck) and Neck Pain      History of Present Illness:   Jaycee Rodriguez is a 40 y.o. female who presents to the ED for right sided head and neck pain. Reports no hx of trauma or fall. Denies hx recurrent HA's. Focused Physical Exam:  VS:    ED Triage Vitals   BP Temp Temp src Pulse Resp SpO2 Height Weight   -- -- -- -- -- -- -- --        Physical Ex: Constitutional: Alert and non-toxic. Medical History:  has a past medical history of Thyroid disease. Surgical History:  has a past surgical history that includes Foot surgery (Right, ); Thyroidectomy (Bilateral, 2020); and  section (N/A, 2022). Social History:  reports that she has been smoking cigarettes. She has been smoking an average of .5 packs per day. She has never used smokeless tobacco. She reports current alcohol use. She reports that she does not use drugs. Family History: family history includes Cancer in her maternal aunt, maternal grandfather, maternal uncle, paternal aunt, paternal grandfather, and paternal uncle; Diabetes in her maternal grandmother; Heart Disease in her father. Allergies: Patient has no known allergies.      Initial Plan of Care: Initiate Treatment-Testing, Proceed toTreatment Area When Bed Available for ED Attending/MLP to Continue Care      ---END OF FIRST PROVIDER CONTACT ASSESSMENT NOTE---  Electronically signed by Chao Nettles PA-C   DD: 23

## 2024-08-02 ENCOUNTER — HOSPITAL ENCOUNTER (OUTPATIENT)
Age: 39
Discharge: HOME | End: 2024-08-04

## 2024-08-02 PROCEDURE — 88305 TISSUE EXAM BY PATHOLOGIST: CPT

## 2024-08-09 LAB — SURGICAL PATHOLOGY REPORT: NORMAL

## 2024-09-16 ENCOUNTER — HOSPITAL ENCOUNTER (OUTPATIENT)
Age: 39
Discharge: HOME | End: 2024-09-18

## 2024-09-16 LAB
ERYTHROCYTE [DISTWIDTH] IN BLOOD BY AUTOMATED COUNT: 13.9 % (ref 11.5–15)
HCT VFR BLD AUTO: 41.6 % (ref 34–48)
HGB BLD-MCNC: 13.2 G/DL (ref 11.5–15.5)
MCH RBC QN AUTO: 27.6 PG (ref 26–35)
MCHC RBC AUTO-ENTMCNC: 31.7 G/DL (ref 32–34.5)
MCV RBC AUTO: 86.8 FL (ref 80–99.9)
PLATELET # BLD AUTO: 408 K/UL (ref 130–450)
PMV BLD AUTO: 9.9 FL (ref 7–12)
RBC # BLD AUTO: 4.79 M/UL (ref 3.5–5.5)
WBC OTHER # BLD: 8.7 K/UL (ref 4.5–11.5)

## 2024-09-16 PROCEDURE — 85027 COMPLETE CBC AUTOMATED: CPT

## 2024-09-19 LAB — SURGICAL PATHOLOGY REPORT: NORMAL

## (undated) DEVICE — CONTAINER SPEC 64OZ POLYPR PATH SNAP LOK CAP W/ LID

## (undated) DEVICE — SOLUTION IV IRRIG WATER 1000ML POUR BRL 2F7114

## (undated) DEVICE — EMG TUBE 8229707 NIM TRIVANTAGE 7.0MM ID: Brand: NIM TRIVANTAGE™

## (undated) DEVICE — GLOVE SURG SZ 75 L12IN FNGR THK83MIL CRM POLYISOPRENE

## (undated) DEVICE — SET INSTR BABY LAP

## (undated) DEVICE — MEDI-VAC YANKAUER SUCTION HANDLE W/BULBOUS TIP: Brand: CARDINAL HEALTH

## (undated) DEVICE — 4-PORT MANIFOLD: Brand: NEPTUNE 2

## (undated) DEVICE — COVER HNDL LT DISP

## (undated) DEVICE — EXTRAS THYROID

## (undated) DEVICE — WOUND RETRACTOR AND PROTECTOR: Brand: ALEXIS WOUND PROTECTOR-RETRACTOR

## (undated) DEVICE — CONTROL SYRINGE LUER-LOCK TIP: Brand: MONOJECT

## (undated) DEVICE — APPLICATOR PREP 26ML 0.7% IOD POVACRYLEX 74% ISO ALC ST

## (undated) DEVICE — PACK PROCEDURE SURG GEN CUST

## (undated) DEVICE — GAUZE,SPONGE,4"X4",16PLY,XRAY,STRL,LF: Brand: MEDLINE

## (undated) DEVICE — SHEET,DRAPE,53X77,STERILE: Brand: MEDLINE

## (undated) DEVICE — CONTAINER,SPEC,PNEUM TUBE,3OZ,STRL PATH: Brand: MEDLINE

## (undated) DEVICE — STRIP,CLOSURE,WOUND,MEDI-STRIP,1/2X4: Brand: MEDLINE

## (undated) DEVICE — GOWN,SIRUS,FABRNF,L,20/CS: Brand: MEDLINE

## (undated) DEVICE — TOWEL,OR,DSP,ST,BLUE,STD,6/PK,12PK/CS: Brand: MEDLINE

## (undated) DEVICE — MASTISOL ADHESIVE LIQ 2/3ML

## (undated) DEVICE — CATHETERIZATION KIT FOL16 FR 2000 CC DRAINAGE BG LUBRICATH

## (undated) DEVICE — SPONGE LAP W18XL18IN WHT COT 4 PLY FLD STRUNG RADPQ DISP ST

## (undated) DEVICE — SUTURE VCRL + SZ 0 L36IN ABSRB VLT L36MM CT-1 1/2 CIR VCP346H

## (undated) DEVICE — SET INSTR DISSECT ENT

## (undated) DEVICE — NEEDLE HYPO 25GA L1.5IN BLU POLYPR HUB S STL REG BVL STR

## (undated) DEVICE — GOWN,SIRUS,POLYRNF,BRTHSLV,XLN/XXL,18/CS: Brand: MEDLINE

## (undated) DEVICE — STAPLER SKIN SQ 30 ABSRB STPL DISP INSORB

## (undated) DEVICE — DOUBLE BASIN SET: Brand: MEDLINE INDUSTRIES, INC.

## (undated) DEVICE — WOUND RETRACTOR AND PROTECTOR: Brand: ALEXIS O WOUND PROTECTOR-RETRACTOR

## (undated) DEVICE — FORCEPS BIPOLAR BAYONET NEURO

## (undated) DEVICE — SKIN AFFIX SURG ADHESIVE 72/CS 0.55ML: Brand: MEDLINE

## (undated) DEVICE — SPONGE,PEANUT,XRAY,ST,SM,3/8",5/CARD: Brand: MEDLINE INDUSTRIES, INC.

## (undated) DEVICE — TUBE BLD COLLECT ST 1 SIL COAT 7ML 10ML

## (undated) DEVICE — COUNTER NDL 30 COUNT DBL MAG

## (undated) DEVICE — SUTURE VCRL + SZ 3-0 L36IN ABSRB UD L36MM CT-1 1/2 CIR VCP944H

## (undated) DEVICE — PROBE 8225101 5PK STD PRASS FL TIP ROHS

## (undated) DEVICE — AGENT HEMSTAT 3GM PURIFIED PLNT STARCH PWD ABSRB ARISTA AH

## (undated) DEVICE — BLADE ES ELASTOMERIC COAT INSUL DURABLE BEND UPTO 90DEG

## (undated) DEVICE — BAG: SPONGE CT 10.25X32 2.0ML BLU 250/CS: Brand: MEDICAL ACTION INDUSTRIES

## (undated) DEVICE — TUBING, SUCTION, 3/16" X 12', STRAIGHT: Brand: MEDLINE

## (undated) DEVICE — JP CHAN DRN SIL RND 15FR FULL W/TRO: Brand: JACKSON-PRATT

## (undated) DEVICE — SOLUTION IV IRRIG POUR BRL 0.9% SODIUM CHL 2F7124

## (undated) DEVICE — ELECTRODE ELECSURG NDL 2.8 INX7.2 CM COAT INSUL EDGE

## (undated) DEVICE — SUTURE VCRL SZ 3-0 L27IN ABSRB UD L26MM SH 1/2 CIR J416H

## (undated) DEVICE — 3M™ TEGADERM™ TRANSPARENT FILM DRESSING FRAME STYLE, 1626W, 4 IN X 4-3/4 IN (10 CM X 12 CM), 50/CT 4CT/CASE: Brand: 3M™ TEGADERM™

## (undated) DEVICE — Device

## (undated) DEVICE — CORD,CAUTERY,BIPOLAR,STERILE: Brand: MEDLINE

## (undated) DEVICE — PEN: MARKING STD 100/CS: Brand: MEDICAL ACTION INDUSTRIES

## (undated) DEVICE — SUTURE MCRYL SZ 4-0 L18IN ABSRB UD L19MM PS-2 3/8 CIR PRIM Y496G

## (undated) DEVICE — CESAREAN BIRTH PACK II: Brand: MEDLINE INDUSTRIES, INC.

## (undated) DEVICE — SUTURE ETHLN SZ 3-0 L18IN NONABSORBABLE BLK L24MM PS-1 3/8 1663G

## (undated) DEVICE — PENCIL ES L3M BTTN SWCH HOLSTER W/ BLDE ELECTRD EDGE

## (undated) DEVICE — ELECTRODE PT RET AD L9FT HI MOIST COND ADH HYDRGEL CORDED

## (undated) DEVICE — MAGNETIC INSTR DRAPE 20X16: Brand: MEDLINE INDUSTRIES, INC.

## (undated) DEVICE — INSTRUMENT HARMONIC CORD BLUE REUSABLE

## (undated) DEVICE — BLADE SURG NO20 S STL STR DISP GLASSVAN

## (undated) DEVICE — COVER,LIGHT HANDLE,FLX,2/PK: Brand: MEDLINE INDUSTRIES, INC.

## (undated) DEVICE — SPONGE,DRAIN,NONWVN,4"X4",6PLY,STRL,LF: Brand: MEDLINE

## (undated) DEVICE — BARRIER ADH W3XL4IN UTER PELV ABSRB GYNECARE INTCEED

## (undated) DEVICE — SYSTEM SURG HEMSTAT PWD 1 GM POLYSACCHARIDE HEMOSPHERES

## (undated) DEVICE — INTENDED FOR TISSUE SEPARATION, AND OTHER PROCEDURES THAT REQUIRE A SHARP SURGICAL BLADE TO PUNCTURE OR CUT.: Brand: BARD-PARKER ® STAINLESS STEEL BLADES

## (undated) DEVICE — SURGICAL PROCEDURE PACK EENT CUST

## (undated) DEVICE — GLOVE ORANGE PI 7 1/2   MSG9075

## (undated) DEVICE — GLOVE SURG SZ 65 L12IN FNGR THK83MIL CRM POLYISOPRENE

## (undated) DEVICE — READY WET SKIN SCRUB TRAY-LF: Brand: MEDLINE INDUSTRIES, INC.

## (undated) DEVICE — 3000CC GUARDIAN II: Brand: GUARDIAN